# Patient Record
Sex: FEMALE | Race: WHITE | NOT HISPANIC OR LATINO | Employment: OTHER | URBAN - METROPOLITAN AREA
[De-identification: names, ages, dates, MRNs, and addresses within clinical notes are randomized per-mention and may not be internally consistent; named-entity substitution may affect disease eponyms.]

---

## 2023-09-15 LAB
ALANINE AMINOTRANSFERASE (SGPT) (U/L) IN SER/PLAS: 14 U/L (ref 7–45)
ALBUMIN (G/DL) IN SER/PLAS: 4.4 G/DL (ref 3.4–5)
ALKALINE PHOSPHATASE (U/L) IN SER/PLAS: 110 U/L (ref 33–136)
ANION GAP IN SER/PLAS: 16 MMOL/L (ref 10–20)
ASPARTATE AMINOTRANSFERASE (SGOT) (U/L) IN SER/PLAS: 19 U/L (ref 9–39)
BASOPHILS (10*3/UL) IN BLOOD BY AUTOMATED COUNT: 0.03 X10E9/L (ref 0–0.1)
BASOPHILS/100 LEUKOCYTES IN BLOOD BY AUTOMATED COUNT: 0.2 % (ref 0–2)
BILIRUBIN TOTAL (MG/DL) IN SER/PLAS: 0.6 MG/DL (ref 0–1.2)
CALCIDIOL (25 OH VITAMIN D3) (NG/ML) IN SER/PLAS: 49 NG/ML
CALCIUM (MG/DL) IN SER/PLAS: 9.9 MG/DL (ref 8.6–10.6)
CARBON DIOXIDE, TOTAL (MMOL/L) IN SER/PLAS: 28 MMOL/L (ref 21–32)
CHLORIDE (MMOL/L) IN SER/PLAS: 94 MMOL/L (ref 98–107)
CHOLESTEROL (MG/DL) IN SER/PLAS: 155 MG/DL (ref 0–199)
CHOLESTEROL IN HDL (MG/DL) IN SER/PLAS: 52.1 MG/DL
CHOLESTEROL/HDL RATIO: 3
CREATININE (MG/DL) IN SER/PLAS: 1.11 MG/DL (ref 0.5–1.05)
EOSINOPHILS (10*3/UL) IN BLOOD BY AUTOMATED COUNT: 0.01 X10E9/L (ref 0–0.7)
EOSINOPHILS/100 LEUKOCYTES IN BLOOD BY AUTOMATED COUNT: 0.1 % (ref 0–6)
ERYTHROCYTE DISTRIBUTION WIDTH (RATIO) BY AUTOMATED COUNT: 14.1 % (ref 11.5–14.5)
ERYTHROCYTE MEAN CORPUSCULAR HEMOGLOBIN CONCENTRATION (G/DL) BY AUTOMATED: 31.3 G/DL (ref 32–36)
ERYTHROCYTE MEAN CORPUSCULAR VOLUME (FL) BY AUTOMATED COUNT: 89 FL (ref 80–100)
ERYTHROCYTES (10*6/UL) IN BLOOD BY AUTOMATED COUNT: 4.57 X10E12/L (ref 4–5.2)
ESTIMATED AVERAGE GLUCOSE FOR HBA1C: 128 MG/DL
GFR FEMALE: 54 ML/MIN/1.73M2
GLUCOSE (MG/DL) IN SER/PLAS: 126 MG/DL (ref 74–99)
HEMATOCRIT (%) IN BLOOD BY AUTOMATED COUNT: 40.9 % (ref 36–46)
HEMOGLOBIN (G/DL) IN BLOOD: 12.8 G/DL (ref 12–16)
HEMOGLOBIN A1C/HEMOGLOBIN TOTAL IN BLOOD: 6.1 %
IMMATURE GRANULOCYTES/100 LEUKOCYTES IN BLOOD BY AUTOMATED COUNT: 0.7 % (ref 0–0.9)
LDL: 72 MG/DL (ref 0–99)
LEUKOCYTES (10*3/UL) IN BLOOD BY AUTOMATED COUNT: 13.9 X10E9/L (ref 4.4–11.3)
LYMPHOCYTES (10*3/UL) IN BLOOD BY AUTOMATED COUNT: 2.49 X10E9/L (ref 1.2–4.8)
LYMPHOCYTES/100 LEUKOCYTES IN BLOOD BY AUTOMATED COUNT: 17.9 % (ref 13–44)
MONOCYTES (10*3/UL) IN BLOOD BY AUTOMATED COUNT: 1.2 X10E9/L (ref 0.1–1)
MONOCYTES/100 LEUKOCYTES IN BLOOD BY AUTOMATED COUNT: 8.6 % (ref 2–10)
NEUTROPHILS (10*3/UL) IN BLOOD BY AUTOMATED COUNT: 10.06 X10E9/L (ref 1.2–7.7)
NEUTROPHILS/100 LEUKOCYTES IN BLOOD BY AUTOMATED COUNT: 72.5 % (ref 40–80)
NRBC (PER 100 WBCS) BY AUTOMATED COUNT: 0 /100 WBC (ref 0–0)
PARATHYRIN INTACT (PG/ML) IN SER/PLAS: 37.8 PG/ML (ref 18.5–88)
PLATELETS (10*3/UL) IN BLOOD AUTOMATED COUNT: 343 X10E9/L (ref 150–450)
POTASSIUM (MMOL/L) IN SER/PLAS: 3.5 MMOL/L (ref 3.5–5.3)
PROTEIN TOTAL: 7 G/DL (ref 6.4–8.2)
SODIUM (MMOL/L) IN SER/PLAS: 134 MMOL/L (ref 136–145)
TRIGLYCERIDE (MG/DL) IN SER/PLAS: 157 MG/DL (ref 0–149)
UREA NITROGEN (MG/DL) IN SER/PLAS: 19 MG/DL (ref 6–23)
VLDL: 31 MG/DL (ref 0–40)

## 2023-10-06 PROBLEM — D72.828 NEUTROPHILIC LEUKOCYTOSIS: Status: ACTIVE | Noted: 2023-10-06

## 2023-10-06 PROBLEM — G47.33 OSA ON CPAP: Status: ACTIVE | Noted: 2023-10-06

## 2023-10-06 PROBLEM — R19.4 CHANGE IN BOWEL HABITS: Status: ACTIVE | Noted: 2023-10-06

## 2023-10-06 PROBLEM — I25.10 CORONARY ARTERY DISEASE: Status: ACTIVE | Noted: 2023-10-06

## 2023-10-06 PROBLEM — R53.83 FATIGUE: Status: ACTIVE | Noted: 2023-10-06

## 2023-10-06 PROBLEM — M85.80 OSTEOPENIA: Status: ACTIVE | Noted: 2023-10-06

## 2023-10-06 PROBLEM — M06.09 SERONEGATIVE ARTHROPATHY OF MULTIPLE SITES (MULTI): Status: ACTIVE | Noted: 2023-10-06

## 2023-10-06 PROBLEM — E55.9 VITAMIN D DEFICIENCY: Status: ACTIVE | Noted: 2023-10-06

## 2023-10-06 PROBLEM — E11.29 TYPE 2 DIABETES MELLITUS WITH OTHER DIABETIC KIDNEY COMPLICATION (MULTI): Status: ACTIVE | Noted: 2023-10-06

## 2023-10-06 PROBLEM — R79.89 HIGH SERUM PARATHYROID HORMONE (PTH): Status: ACTIVE | Noted: 2023-10-06

## 2023-10-06 PROBLEM — I10 BENIGN ESSENTIAL HYPERTENSION: Status: ACTIVE | Noted: 2023-10-06

## 2023-10-06 PROBLEM — D72.9 NEUTROPHILIC LEUKOCYTOSIS: Status: ACTIVE | Noted: 2023-10-06

## 2023-10-06 PROBLEM — E11.29 MICROALBUMINURIA DUE TO TYPE 2 DIABETES MELLITUS (MULTI): Status: ACTIVE | Noted: 2023-10-06

## 2023-10-06 PROBLEM — G47.00 INSOMNIA: Status: ACTIVE | Noted: 2023-10-06

## 2023-10-06 PROBLEM — E66.3 OVERWEIGHT WITH BODY MASS INDEX (BMI) OF 28 TO 28.9 IN ADULT: Status: ACTIVE | Noted: 2023-10-06

## 2023-10-06 PROBLEM — R06.02 SHORTNESS OF BREATH AT REST: Status: ACTIVE | Noted: 2023-10-06

## 2023-10-06 PROBLEM — D86.0 SARCOIDOSIS OF LUNG (MULTI): Status: ACTIVE | Noted: 2023-10-06

## 2023-10-06 PROBLEM — E78.2 MIXED HYPERLIPIDEMIA: Status: ACTIVE | Noted: 2023-10-06

## 2023-10-06 PROBLEM — K52.9 DIARRHEA, SECRETORY: Status: ACTIVE | Noted: 2023-10-06

## 2023-10-06 PROBLEM — R80.9 MICROALBUMINURIA DUE TO TYPE 2 DIABETES MELLITUS (MULTI): Status: ACTIVE | Noted: 2023-10-06

## 2023-10-06 PROBLEM — M12.9 ARTHROPATHY: Status: ACTIVE | Noted: 2023-10-06

## 2023-10-06 PROBLEM — R00.1 BRADYCARDIA: Status: ACTIVE | Noted: 2023-10-06

## 2023-10-06 PROBLEM — F32.5 DEPRESSION, MAJOR, IN REMISSION (CMS-HCC): Status: ACTIVE | Noted: 2023-10-06

## 2023-10-06 PROBLEM — M19.90 OSTEOARTHRITIS: Status: ACTIVE | Noted: 2023-10-06

## 2023-10-06 PROBLEM — L40.9 PSORIASIS: Status: ACTIVE | Noted: 2023-10-06

## 2023-10-06 PROBLEM — F41.1 GENERALIZED ANXIETY DISORDER: Status: ACTIVE | Noted: 2023-10-06

## 2023-10-06 PROBLEM — D86.9 SARCOIDOSIS: Status: ACTIVE | Noted: 2023-10-06

## 2023-10-06 PROBLEM — M81.0 OSTEOPOROSIS: Status: ACTIVE | Noted: 2023-10-06

## 2023-10-06 RX ORDER — TRIAMCINOLONE ACETONIDE 1 MG/G
OINTMENT TOPICAL
COMMUNITY
Start: 2022-03-31

## 2023-10-06 RX ORDER — VENLAFAXINE HYDROCHLORIDE 150 MG/1
150 CAPSULE, EXTENDED RELEASE ORAL
COMMUNITY
Start: 2020-06-01 | End: 2024-05-09

## 2023-10-06 RX ORDER — PIMECROLIMUS 10 MG/G
CREAM TOPICAL 2 TIMES DAILY
COMMUNITY
Start: 2021-02-22 | End: 2023-11-02 | Stop reason: ALTCHOICE

## 2023-10-06 RX ORDER — PHENOL 1.4 %
1 AEROSOL, SPRAY (ML) MUCOUS MEMBRANE DAILY
COMMUNITY
Start: 2020-06-01

## 2023-10-06 RX ORDER — CHOLESTYRAMINE 4 G/9G
POWDER, FOR SUSPENSION ORAL
COMMUNITY
End: 2023-10-17 | Stop reason: SDUPTHER

## 2023-10-06 RX ORDER — CHOLECALCIFEROL (VITAMIN D3) 50 MCG
1 TABLET ORAL 2 TIMES DAILY
COMMUNITY
Start: 2020-09-14

## 2023-10-06 RX ORDER — MELOXICAM 15 MG/1
1 TABLET ORAL DAILY PRN
COMMUNITY
Start: 2020-06-01 | End: 2024-05-09

## 2023-10-06 RX ORDER — DAPAGLIFLOZIN 10 MG/1
1 TABLET, FILM COATED ORAL EVERY MORNING
COMMUNITY
Start: 2020-06-01 | End: 2024-01-04

## 2023-10-06 RX ORDER — ATENOLOL 25 MG/1
1 TABLET ORAL DAILY
COMMUNITY
Start: 2020-06-01

## 2023-10-06 RX ORDER — DOXEPIN 3 MG/1
1 TABLET, FILM COATED ORAL NIGHTLY PRN
COMMUNITY

## 2023-10-06 RX ORDER — SEMAGLUTIDE 1.34 MG/ML
0.5 INJECTION, SOLUTION SUBCUTANEOUS
COMMUNITY
Start: 2021-09-01 | End: 2023-11-28 | Stop reason: ALTCHOICE

## 2023-10-06 RX ORDER — HYDROXYCHLOROQUINE SULFATE 200 MG/1
1 TABLET, FILM COATED ORAL 2 TIMES DAILY
COMMUNITY
Start: 2020-06-01 | End: 2023-11-02 | Stop reason: SDUPTHER

## 2023-10-06 RX ORDER — CALCIUM CARBONATE 600 MG
2 TABLET ORAL DAILY
COMMUNITY
Start: 2020-06-01

## 2023-10-06 RX ORDER — BUTYROSPERMUM PARKII(SHEA BUTTER), SIMMONDSIA CHINENSIS (JOJOBA) SEED OIL, ALOE BARBADENSIS LEAF EXTRACT .01; 1; 3.5 G/100G; G/100G; G/100G
250 LIQUID TOPICAL 2 TIMES DAILY
COMMUNITY

## 2023-10-06 RX ORDER — HYDROCORTISONE 25 MG/G
CREAM TOPICAL
COMMUNITY
Start: 2022-03-17

## 2023-10-06 RX ORDER — INSULIN DEGLUDEC 100 U/ML
28 INJECTION, SOLUTION SUBCUTANEOUS NIGHTLY
COMMUNITY
Start: 2020-06-01 | End: 2023-11-02 | Stop reason: ALTCHOICE

## 2023-10-06 RX ORDER — LISINOPRIL 10 MG/1
1 TABLET ORAL DAILY
COMMUNITY
Start: 2020-06-01 | End: 2024-01-04

## 2023-10-06 RX ORDER — CLOBETASOL PROPIONATE 0.05 MG/G
GEL TOPICAL 2 TIMES DAILY
COMMUNITY

## 2023-10-06 RX ORDER — LOVASTATIN 10 MG/1
1 TABLET ORAL EVERY EVENING
COMMUNITY
Start: 2020-06-01 | End: 2024-05-09

## 2023-10-06 RX ORDER — TRIAMTERENE AND HYDROCHLOROTHIAZIDE 37.5; 25 MG/1; MG/1
1 CAPSULE ORAL DAILY
COMMUNITY
Start: 2020-06-01

## 2023-10-09 ENCOUNTER — OFFICE VISIT (OUTPATIENT)
Dept: HEMATOLOGY/ONCOLOGY | Facility: CLINIC | Age: 68
End: 2023-10-09
Payer: COMMERCIAL

## 2023-10-09 ENCOUNTER — LAB (OUTPATIENT)
Dept: LAB | Facility: LAB | Age: 68
End: 2023-10-09
Payer: COMMERCIAL

## 2023-10-09 VITALS
HEART RATE: 74 BPM | DIASTOLIC BLOOD PRESSURE: 80 MMHG | SYSTOLIC BLOOD PRESSURE: 120 MMHG | OXYGEN SATURATION: 96 % | BODY MASS INDEX: 25.12 KG/M2 | RESPIRATION RATE: 18 BRPM | WEIGHT: 160.05 LBS | HEIGHT: 67 IN | TEMPERATURE: 98.2 F

## 2023-10-09 DIAGNOSIS — D72.9 NEUTROPHILIC LEUKOCYTOSIS: Primary | ICD-10-CM

## 2023-10-09 DIAGNOSIS — D72.9 NEUTROPHILIC LEUKOCYTOSIS: ICD-10-CM

## 2023-10-09 PROCEDURE — 1126F AMNT PAIN NOTED NONE PRSNT: CPT | Performed by: INTERNAL MEDICINE

## 2023-10-09 PROCEDURE — 3044F HG A1C LEVEL LT 7.0%: CPT | Performed by: INTERNAL MEDICINE

## 2023-10-09 PROCEDURE — 99204 OFFICE O/P NEW MOD 45 MIN: CPT | Performed by: INTERNAL MEDICINE

## 2023-10-09 PROCEDURE — 36415 COLL VENOUS BLD VENIPUNCTURE: CPT

## 2023-10-09 PROCEDURE — 1159F MED LIST DOCD IN RCRD: CPT | Performed by: INTERNAL MEDICINE

## 2023-10-09 PROCEDURE — G0452 MOLECULAR PATHOLOGY INTERPR: HCPCS | Performed by: INTERNAL MEDICINE

## 2023-10-09 PROCEDURE — 81450 HL NEO GSAP 5-50DNA/DNA&RNA: CPT

## 2023-10-09 PROCEDURE — 3079F DIAST BP 80-89 MM HG: CPT | Performed by: INTERNAL MEDICINE

## 2023-10-09 PROCEDURE — 4010F ACE/ARB THERAPY RXD/TAKEN: CPT | Performed by: INTERNAL MEDICINE

## 2023-10-09 PROCEDURE — 99214 OFFICE O/P EST MOD 30 MIN: CPT | Performed by: INTERNAL MEDICINE

## 2023-10-09 PROCEDURE — 3074F SYST BP LT 130 MM HG: CPT | Performed by: INTERNAL MEDICINE

## 2023-10-09 ASSESSMENT — PAIN SCALES - GENERAL: PAINLEVEL: 0-NO PAIN

## 2023-10-09 NOTE — PROGRESS NOTES
Patient ID: Liliana Bhardwaj is a 68 y.o. female.  Referring Physician: No referring provider defined for this encounter.  Primary Care Provider: Ariana Escalera MD      Subjective    HPI  The patient is a 68-year-old woman with extensive past medical history with coronary artery disease, diabetes mellitus hypertension hypercholesterolemia elevated PTH levels sarcoidosis was referred for further evaluation and management of leukocytosis/neutrophilia.  The patient is asymptomatic and surprised to be referred.    At interview on October 9, 2023 she was accompanied by her .  The patient narrated entire history.  Denied history of weight loss fevers night sweats chest pain shortness of breath nausea vomiting hematemesis melena hematochezia hematuria     · Arthropathy (716.90) (M12.9)   · Benign essential hypertension (401.1) (I10)   · Bradycardia (427.89) (R00.1)   · Change in bowel habits (787.99) (R19.4)   · Colon cancer screening (V76.51) (Z12.11)   · Coronary artery disease (414.00) (I25.10)   · Cough (786.2) (R05.9)   · Depression, major, in remission (296.25) (F32.5)   · Diarrhea, secretory (787.91) (K52.9)   · Fatigue (780.79) (R53.83)   · Generalized anxiety disorder (300.02) (F41.1)   · High serum parathyroid hormone (PTH) (790.99) (R79.89)   · Mammogram declined (V64.2) (Z53.20)   · Microalbuminuria due to type 2 diabetes mellitus (250.40,791.0) (E11.29,R80.9)   · Mixed hyperlipidemia (272.2) (E78.2)   · Need for influenza vaccination (V04.81) (Z23)   · Need for pneumococcal vaccination (V03.82) (Z23)   · CALEB on CPAP (327.23) (G47.33)   · Osteoarthritis (715.90) (M19.90)   · Osteopenia (733.90) (M85.80)   · Osteoporosis (733.00) (M81.0)   · Pain of left upper extremity (729.5) (M79.602)   · Psoriasis (696.1) (L40.9)   · Sarcoidosis (135) (D86.9)   · Sarcoidosis of lung (135,517.8) (D86.0)   · 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 von Willebrands   · History of COVID (079.89) (U07.1)   · Resolved Date: 23 Feb 2023   · History of COVID-19 virus infection (079.89) (U07.1)   · Resolved Date: 07 Jun 2021   · History of COVID-19 virus infection (079.89) (U07.1)   · Resolved Date: 23 Feb 2023   · History of Fracture of left humerus (812.20) (S42.302A)   · Resolved Date: 14 Sep 2020   · History of myocardial infarction (412) (I25.2)   · History of osteoporosis (V13.59) (Z87.39)   · Resolved Date: 28 Dec 2020   · History of Sarcoidosis, lung (135,517.8) (D86.0)     Surgical History  Problems    · History of Ankle fracture repair   · right   · History of Carpal tunnel surgery   · right and left at same time   · History of Cataract surgery   · History of Cholecystectomy   · History of Complete colonoscopy   · repeat 3 years. Dr. Quevedo   · History of Coronary artery bypass graft   · x4   · History of Lumbar vertebral fusion   · History of Lung biopsy     Family History  Mother    · Family history of coronary artery disease (V17.3) (Z82.49)   · Family history of type 2 diabetes mellitus (V18.0) (Z83.3)  Father    · Family history of coronary artery disease (V17.3) (Z82.49)   · Family history of mixed hyperlipidemia (V18.3) (Z83.438)   · Family history of neoplasm of brain (V19.8) (Z84.89)  Brother    · Family history of coronary artery disease (V17.3) (Z82.49)  Paternal Grandfather    · Family history of type 2 diabetes mellitus (V18.0) (Z83.3)     Social History  Problems    · Consumes alcohol occasionally (V49.89) (Z78.9)   · Daily caffeine consumption   · Disabled   ·    · Does not use  "illicit drugs (V49.89) (Z78.9)   · Former smoker (V15.82) (Z87.891)   · Former tobacco use (V15.82) (Z87.891)   · smoked 5 packs a day for 17 years quit in 1990   ·    · Mother   · No exposure to domestic violence       Review of Systems   All other systems reviewed and are negative.       Objective   BSA: 1.85 meters squared  /80 (BP Location: Left arm, Patient Position: Sitting, BP Cuff Size: Adult)   Pulse 74   Temp 36.8 °C (98.2 °F) (Temporal)   Resp 18   Ht 1.69 m (5' 6.54\")   Wt 72.6 kg (160 lb 0.9 oz)   SpO2 96%   BMI 25.42 kg/m²     Family History   Problem Relation Name Age of Onset    Coronary artery disease Mother      Diabetes type II Mother      Coronary artery disease Father      Hyperlipidemia Father          mixed    Brain cancer Father      Coronary artery disease Brother      Diabetes type II Paternal Grandfather       Oncology History    No history exists.       Liliana HATCH Lashae  has no history on file for tobacco use.  She  has no history on file for alcohol use.  She  has no history on file for drug use.    Physical Exam  Constitutional:       Appearance: Normal appearance.   HENT:      Head: Normocephalic and atraumatic.      Nose: Nose normal.      Mouth/Throat:      Mouth: Mucous membranes are moist.      Pharynx: Oropharynx is clear.   Eyes:      Extraocular Movements: Extraocular movements intact.      Conjunctiva/sclera: Conjunctivae normal.      Pupils: Pupils are equal, round, and reactive to light.   Cardiovascular:      Rate and Rhythm: Normal rate and regular rhythm.   Pulmonary:      Effort: Pulmonary effort is normal.      Breath sounds: Normal breath sounds.   Abdominal:      General: Abdomen is flat. Bowel sounds are normal.      Palpations: Abdomen is soft.   Musculoskeletal:         General: Normal range of motion.      Cervical back: Normal range of motion and neck supple.   Neurological:      General: No focal deficit present.      Mental Status: She is alert " and oriented to person, place, and time. Mental status is at baseline.   Psychiatric:         Mood and Affect: Mood normal.         Behavior: Behavior normal.         Thought Content: Thought content normal.         Judgment: Judgment normal.         Performance Status:  Asymptomatic    Assessment/Plan    The patient is a 68-year-old woman with extensive past medical history with coronary artery disease, diabetes mellitus hypertension hypercholesterolemia elevated PTH levels sarcoidosis was referred for further evaluation and management of leukocytosis/neutrophilia.  The patient is asymptomatic and surprised to be referred.    Physical examination revealed obesity.  I had a detailed discussion with the patient explained to her the role of WBCs and protecting the body against infection.  Differential diagnosis of leukocytosis includes primary causes such as myeloproliferative syndrome secondary causes such as demargination from medications, smoking, infection inflammation.  The patient uses triamcinolone for psoriasis and also has underlying sarcoidosis which could both cause leukocytosis/neutrophilia.  However it would be prudent to rule out myeloproliferative syndrome and recommended MPN studies.  The patient understood appreciated all the details provided and was grateful    Thank you for allowing me to participate in care of your patient if you have any questions please feel free to call me.       Orders Placed This Encounter   Procedures    Myeloid Malignancies NGS Panel     Standing Status:   Future     Standing Expiration Date:   10/9/2024     Order Specific Question:   Release result to [employer]     Answer:   Immediate                  There are no diagnoses linked to this encounter.           Roger Schmitz MD

## 2023-10-10 ENCOUNTER — TELEPHONE (OUTPATIENT)
Dept: HEMATOLOGY/ONCOLOGY | Facility: CLINIC | Age: 68
End: 2023-10-10
Payer: COMMERCIAL

## 2023-10-13 LAB
ELECTRONICALLY SIGNED BY: NORMAL
MYELOID NGS RESULTS: NORMAL

## 2023-10-16 PROBLEM — S42.202D CLOSED FRACTURE OF PROXIMAL END OF LEFT HUMERUS WITH ROUTINE HEALING: Status: ACTIVE | Noted: 2020-04-10

## 2023-10-16 PROBLEM — I25.10 CORONARY ARTERIOSCLEROSIS: Status: ACTIVE | Noted: 2021-06-22

## 2023-10-16 PROBLEM — E11.9 TYPE 2 DIABETES MELLITUS (MULTI): Status: ACTIVE | Noted: 2021-06-22

## 2023-10-16 RX ORDER — CLOBETASOL PROPIONATE 0.5 MG/G
0.05 OINTMENT TOPICAL 2 TIMES DAILY
COMMUNITY
Start: 2023-02-23 | End: 2023-11-28 | Stop reason: ALTCHOICE

## 2023-10-16 RX ORDER — MOLNUPIRAVIR 200 MG/1
1 CAPSULE ORAL DAILY
COMMUNITY
Start: 2022-12-29 | End: 2023-11-02 | Stop reason: ALTCHOICE

## 2023-10-16 RX ORDER — GABAPENTIN 600 MG/1
900 TABLET ORAL DAILY
COMMUNITY
End: 2023-11-02 | Stop reason: ALTCHOICE

## 2023-10-16 RX ORDER — FERROUS SULFATE, DRIED 160(50) MG
1200 TABLET, EXTENDED RELEASE ORAL DAILY
COMMUNITY
End: 2023-10-31 | Stop reason: SDUPTHER

## 2023-10-16 RX ORDER — ASPIRIN 81 MG/1
81 TABLET ORAL
COMMUNITY

## 2023-10-16 RX ORDER — DEXAMETHASONE 6 MG/1
6 TABLET ORAL DAILY
COMMUNITY
Start: 2022-12-29 | End: 2023-11-02 | Stop reason: ALTCHOICE

## 2023-10-16 RX ORDER — FOLIC ACID 1 MG/1
1 TABLET ORAL DAILY
COMMUNITY
Start: 2020-03-18 | End: 2023-11-02 | Stop reason: ALTCHOICE

## 2023-10-16 RX ORDER — METOCLOPRAMIDE 5 MG/1
5 TABLET ORAL 4 TIMES DAILY
COMMUNITY
Start: 2019-09-30 | End: 2023-11-02 | Stop reason: ALTCHOICE

## 2023-10-16 RX ORDER — CETIRIZINE HYDROCHLORIDE 10 MG/1
10 TABLET ORAL DAILY
COMMUNITY
Start: 2016-09-23 | End: 2023-11-28 | Stop reason: ALTCHOICE

## 2023-10-16 RX ORDER — METFORMIN HYDROCHLORIDE 500 MG/1
500 TABLET ORAL
COMMUNITY
Start: 2016-11-08 | End: 2023-11-28 | Stop reason: ALTCHOICE

## 2023-10-16 RX ORDER — INSULIN GLARGINE 100 [IU]/ML
28 INJECTION, SOLUTION SUBCUTANEOUS 2 TIMES DAILY
COMMUNITY
Start: 2017-09-11 | End: 2024-02-16 | Stop reason: SDUPTHER

## 2023-10-16 RX ORDER — METOPROLOL TARTRATE 25 MG/1
25 TABLET, FILM COATED ORAL
COMMUNITY
End: 2023-11-28 | Stop reason: ALTCHOICE

## 2023-10-16 RX ORDER — RISANKIZUMAB-RZAA 75 MG/0.83
150 KIT SUBCUTANEOUS
COMMUNITY
Start: 2020-06-09 | End: 2023-11-02 | Stop reason: ALTCHOICE

## 2023-10-16 RX ORDER — ALENDRONATE SODIUM 70 MG/1
70 TABLET ORAL
COMMUNITY
End: 2023-11-02 | Stop reason: SINTOL

## 2023-10-16 RX ORDER — METHOTREXATE 2.5 MG/1
2.5 TABLET ORAL
COMMUNITY
Start: 2020-03-17 | End: 2023-11-02 | Stop reason: ALTCHOICE

## 2023-10-16 RX ORDER — SEMAGLUTIDE 0.68 MG/ML
2 INJECTION, SOLUTION SUBCUTANEOUS
COMMUNITY
Start: 2023-04-25 | End: 2023-10-31 | Stop reason: SDUPTHER

## 2023-10-16 RX ORDER — SEMAGLUTIDE 1.34 MG/ML
2 INJECTION, SOLUTION SUBCUTANEOUS
COMMUNITY
Start: 2023-07-31 | End: 2024-05-13

## 2023-10-16 RX ORDER — ZOLPIDEM TARTRATE 5 MG/1
5 TABLET ORAL NIGHTLY PRN
COMMUNITY
End: 2023-11-02 | Stop reason: ALTCHOICE

## 2023-10-17 ENCOUNTER — TELEPHONE (OUTPATIENT)
Dept: PRIMARY CARE | Facility: CLINIC | Age: 68
End: 2023-10-17
Payer: COMMERCIAL

## 2023-10-17 DIAGNOSIS — E78.2 MIXED HYPERLIPIDEMIA: Primary | ICD-10-CM

## 2023-10-17 RX ORDER — CHOLESTYRAMINE 4 G/9G
1 POWDER, FOR SUSPENSION ORAL DAILY
Qty: 90 PACKET | Refills: 0 | Status: SHIPPED | OUTPATIENT
Start: 2023-10-17 | End: 2024-02-16 | Stop reason: SDUPTHER

## 2023-10-17 RX ORDER — CHOLESTYRAMINE 4 G/9G
1 POWDER, FOR SUSPENSION ORAL DAILY
Qty: 30 PACKET | Refills: 0 | Status: SHIPPED | OUTPATIENT
Start: 2023-10-17 | End: 2023-10-17 | Stop reason: SDUPTHER

## 2023-10-17 NOTE — TELEPHONE ENCOUNTER
Pt called.  She can not get in touch with dr. Mercado  She is in [place]   Can you call in Cholestyramine 4 mg packet   [facility] [address] [place] [address]   Ph [phone]    *would not let  me add to pharmacy list

## 2023-10-31 PROBLEM — I25.10 CORONARY ARTERIOSCLEROSIS: Status: RESOLVED | Noted: 2021-06-22 | Resolved: 2023-10-31

## 2023-10-31 PROBLEM — E11.9 TYPE 2 DIABETES MELLITUS (MULTI): Status: RESOLVED | Noted: 2021-06-22 | Resolved: 2023-10-31

## 2023-10-31 PROBLEM — R19.4 CHANGE IN BOWEL HABITS: Status: RESOLVED | Noted: 2023-10-06 | Resolved: 2023-10-31

## 2023-11-02 ENCOUNTER — LAB (OUTPATIENT)
Dept: LAB | Facility: LAB | Age: 68
End: 2023-11-02
Payer: COMMERCIAL

## 2023-11-02 ENCOUNTER — OFFICE VISIT (OUTPATIENT)
Dept: RHEUMATOLOGY | Facility: CLINIC | Age: 68
End: 2023-11-02
Payer: COMMERCIAL

## 2023-11-02 ENCOUNTER — OFFICE VISIT (OUTPATIENT)
Dept: HEMATOLOGY/ONCOLOGY | Facility: CLINIC | Age: 68
End: 2023-11-02
Payer: COMMERCIAL

## 2023-11-02 VITALS
DIASTOLIC BLOOD PRESSURE: 64 MMHG | OXYGEN SATURATION: 95 % | WEIGHT: 161.05 LBS | HEART RATE: 80 BPM | TEMPERATURE: 97.2 F | SYSTOLIC BLOOD PRESSURE: 132 MMHG | RESPIRATION RATE: 18 BRPM | BODY MASS INDEX: 25.58 KG/M2

## 2023-11-02 VITALS
TEMPERATURE: 96.7 F | HEIGHT: 66 IN | DIASTOLIC BLOOD PRESSURE: 60 MMHG | SYSTOLIC BLOOD PRESSURE: 111 MMHG | BODY MASS INDEX: 26.02 KG/M2 | WEIGHT: 161.9 LBS | HEART RATE: 71 BPM

## 2023-11-02 DIAGNOSIS — Z79.899 ENCOUNTER FOR MONITORING OF HYDROXYCHLOROQUINE THERAPY: ICD-10-CM

## 2023-11-02 DIAGNOSIS — M81.0 AGE-RELATED OSTEOPOROSIS WITHOUT CURRENT PATHOLOGICAL FRACTURE: ICD-10-CM

## 2023-11-02 DIAGNOSIS — D86.0 SARCOIDOSIS OF LUNG (MULTI): ICD-10-CM

## 2023-11-02 DIAGNOSIS — Z79.631 ON METHOTREXATE THERAPY: ICD-10-CM

## 2023-11-02 DIAGNOSIS — D86.9 SARCOIDOSIS: ICD-10-CM

## 2023-11-02 DIAGNOSIS — M06.09 SERONEGATIVE ARTHROPATHY OF MULTIPLE SITES (MULTI): Primary | ICD-10-CM

## 2023-11-02 DIAGNOSIS — E11.42 DIABETIC POLYNEUROPATHY ASSOCIATED WITH TYPE 2 DIABETES MELLITUS (MULTI): ICD-10-CM

## 2023-11-02 DIAGNOSIS — D72.9 NEUTROPHILIC LEUKOCYTOSIS: Primary | ICD-10-CM

## 2023-11-02 DIAGNOSIS — Z51.81 ENCOUNTER FOR MONITORING OF HYDROXYCHLOROQUINE THERAPY: ICD-10-CM

## 2023-11-02 DIAGNOSIS — M06.09 SERONEGATIVE ARTHROPATHY OF MULTIPLE SITES (MULTI): ICD-10-CM

## 2023-11-02 PROBLEM — M85.80 OSTEOPENIA: Status: RESOLVED | Noted: 2023-10-06 | Resolved: 2023-11-02

## 2023-11-02 PROCEDURE — 4010F ACE/ARB THERAPY RXD/TAKEN: CPT | Performed by: INTERNAL MEDICINE

## 2023-11-02 PROCEDURE — 1159F MED LIST DOCD IN RCRD: CPT | Performed by: INTERNAL MEDICINE

## 2023-11-02 PROCEDURE — 99214 OFFICE O/P EST MOD 30 MIN: CPT | Performed by: INTERNAL MEDICINE

## 2023-11-02 PROCEDURE — 1036F TOBACCO NON-USER: CPT | Performed by: INTERNAL MEDICINE

## 2023-11-02 PROCEDURE — 1160F RVW MEDS BY RX/DR IN RCRD: CPT | Performed by: INTERNAL MEDICINE

## 2023-11-02 PROCEDURE — 1126F AMNT PAIN NOTED NONE PRSNT: CPT | Performed by: INTERNAL MEDICINE

## 2023-11-02 PROCEDURE — 36415 COLL VENOUS BLD VENIPUNCTURE: CPT

## 2023-11-02 PROCEDURE — 3078F DIAST BP <80 MM HG: CPT | Performed by: INTERNAL MEDICINE

## 2023-11-02 PROCEDURE — 3044F HG A1C LEVEL LT 7.0%: CPT | Performed by: INTERNAL MEDICINE

## 2023-11-02 PROCEDURE — 3074F SYST BP LT 130 MM HG: CPT | Performed by: INTERNAL MEDICINE

## 2023-11-02 PROCEDURE — 85652 RBC SED RATE AUTOMATED: CPT

## 2023-11-02 PROCEDURE — 3075F SYST BP GE 130 - 139MM HG: CPT | Performed by: INTERNAL MEDICINE

## 2023-11-02 PROCEDURE — 82164 ANGIOTENSIN I ENZYME TEST: CPT

## 2023-11-02 PROCEDURE — 86140 C-REACTIVE PROTEIN: CPT

## 2023-11-02 RX ORDER — HYDROXYCHLOROQUINE SULFATE 200 MG/1
200 TABLET, FILM COATED ORAL 2 TIMES DAILY
Qty: 180 TABLET | Refills: 3 | Status: SHIPPED | OUTPATIENT
Start: 2023-11-02 | End: 2023-11-02 | Stop reason: SDUPTHER

## 2023-11-02 RX ORDER — HYDROXYCHLOROQUINE SULFATE 200 MG/1
200 TABLET, FILM COATED ORAL 2 TIMES DAILY
Qty: 180 TABLET | Refills: 3 | Status: SHIPPED | OUTPATIENT
Start: 2023-11-02 | End: 2024-11-01

## 2023-11-02 ASSESSMENT — ENCOUNTER SYMPTOMS
BRUISES/BLEEDS EASILY: 1
VOMITING: 0
NUMBNESS: 1
FATIGUE: 1
SLEEP DISTURBANCE: 1
DEPRESSION: 0
ADENOPATHY: 0
EYE REDNESS: 0
MYALGIAS: 1
NAUSEA: 1
UNEXPECTED WEIGHT CHANGE: 1
DIFFICULTY URINATING: 0
NERVOUS/ANXIOUS: 1
EYE ITCHING: 0
EYE DISCHARGE: 0
PALPITATIONS: 0
OCCASIONAL FEELINGS OF UNSTEADINESS: 1
HEADACHES: 0
FLANK PAIN: 0
BACK PAIN: 0
DYSPHORIC MOOD: 1
JOINT SWELLING: 1
POLYDIPSIA: 0
SHORTNESS OF BREATH: 1
LOSS OF SENSATION IN FEET: 0
COLOR CHANGE: 0
RHINORRHEA: 0
EYE PAIN: 0
WEAKNESS: 0
LIGHT-HEADEDNESS: 0
CHEST TIGHTNESS: 0
PHOTOPHOBIA: 0
DYSURIA: 0
FEVER: 1
DIZZINESS: 0
DIARRHEA: 1
CHILLS: 0
CONSTIPATION: 0
ABDOMINAL PAIN: 0
SORE THROAT: 1
TROUBLE SWALLOWING: 0
ARTHRALGIAS: 1
COUGH: 1
HEMATURIA: 0

## 2023-11-02 ASSESSMENT — PATIENT HEALTH QUESTIONNAIRE - PHQ9
2. FEELING DOWN, DEPRESSED OR HOPELESS: NOT AT ALL
1. LITTLE INTEREST OR PLEASURE IN DOING THINGS: NOT AT ALL
SUM OF ALL RESPONSES TO PHQ9 QUESTIONS 1 AND 2: 0
SUM OF ALL RESPONSES TO PHQ9 QUESTIONS 1 AND 2: 0
2. FEELING DOWN, DEPRESSED OR HOPELESS: NOT AT ALL
1. LITTLE INTEREST OR PLEASURE IN DOING THINGS: NOT AT ALL

## 2023-11-02 ASSESSMENT — PAIN SCALES - GENERAL: PAINLEVEL: 0-NO PAIN

## 2023-11-02 ASSESSMENT — COLUMBIA-SUICIDE SEVERITY RATING SCALE - C-SSRS
6. HAVE YOU EVER DONE ANYTHING, STARTED TO DO ANYTHING, OR PREPARED TO DO ANYTHING TO END YOUR LIFE?: NO
2. HAVE YOU ACTUALLY HAD ANY THOUGHTS OF KILLING YOURSELF?: NO
1. IN THE PAST MONTH, HAVE YOU WISHED YOU WERE DEAD OR WISHED YOU COULD GO TO SLEEP AND NOT WAKE UP?: NO

## 2023-11-02 NOTE — ASSESSMENT & PLAN NOTE
You are on chronic plaquenil.     Make sure you see your eye doctor yearly.  If you need an eye doctor, let me know and I can place a referral    Plaquenil is dosed based on your weight.   We will make sure your dose is below the maximum daily dose of 5mg/kg

## 2023-11-02 NOTE — PROGRESS NOTES
Follow-up in 6 months with labs prior outside.  Call back instructions reviewed.  Patient verbalized understanding.

## 2023-11-02 NOTE — PROGRESS NOTES
Patient ID: Liliana Bhardwaj is a 68 y.o. female.  Referring Physician: No referring provider defined for this encounter.  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 TET2 mutations may indicate clonal hematopoiesis when myelodysplastic syndrome (MDS) is suspected, but they should not be used as presumptive evidence of MDS when other diagnostic criteria for MDS have not been met (NCCN 1.2021).  In patients with MDS, TET2 mutations have been associated with a normal karyotype and shown to positively impact the response to hypomethylating agents compared to patients with wild-type TET2 (NCCN 1.2021). TET2 mutations are also frequently identified in cases of CMML (40-60%) and MDS/MPN unclassifiable (overlap syndrome) (NCCN 1.2021). Results should be interpreted in the context of clinical and additional laboratory information.      DISEASE RELEVANT ALTERATIONS NOT DETECTED:  Negative for JAK2 V617F.  Negative for JAK2 exon 12 mutation.  Negative for CALR mutation.  Negative for MPL mutation.               Subjective    HPI  The patient is a 68-year-old woman with extensive past medical history with coronary artery disease, diabetes mellitus hypertension hypercholesterolemia elevated PTH levels sarcoidosis was referred for further evaluation and management of leukocytosis/neutrophilia.  The patient is asymptomatic and surprised to be referred.    At interview on October 9, 2023 she was accompanied by her .  The patient narrated entire history.  Denied history of weight loss fevers night sweats chest pain shortness of breath nausea vomiting hematemesis melena hematochezia hematuria.    The patient and her  had come for follow-up regarding evaluation of leukocytosis/neutrophilia.  The patient is  asymptomatic but is anxious to know results of the tests performed.     · Arthropathy (716.90) (M12.9)   · Benign essential hypertension (401.1) (I10)   · Bradycardia (427.89) (R00.1)   · Change in bowel habits (787.99) (R19.4)   · Colon cancer screening (V76.51) (Z12.11)   · Coronary artery disease (414.00) (I25.10)   · Cough (786.2) (R05.9)   · Depression, major, in remission (296.25) (F32.5)   · Diarrhea, secretory (787.91) (K52.9)   · Fatigue (780.79) (R53.83)   · Generalized anxiety disorder (300.02) (F41.1)   · High serum parathyroid hormone (PTH) (790.99) (R79.89)   · Mammogram declined (V64.2) (Z53.20)   · Microalbuminuria due to type 2 diabetes mellitus (250.40,791.0) (E11.29,R80.9)   · Mixed hyperlipidemia (272.2) (E78.2)   · Need for influenza vaccination (V04.81) (Z23)   · Need for pneumococcal vaccination (V03.82) (Z23)   · CALEB on CPAP (327.23) (G47.33)   · Osteoarthritis (715.90) (M19.90)   · Osteopenia (733.90) (M85.80)   · Osteoporosis (733.00) (M81.0)   · Pain of left upper extremity (729.5) (M79.602)   · Psoriasis (696.1) (L40.9)   · Sarcoidosis (135) (D86.9)   · Sarcoidosis of lung (135,517.8) (D86.0)   · 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 von [employer]illebrands   · History of COVID (079.89) (U07.1)   · Resolved Date: 23 Feb 2023   · History of COVID-19 virus infection (079.89) (U07.1)   · Resolved Date: 07 Jun 2021   · History of COVID-19 virus infection (079.89) (U07.1)   · Resolved Date: 23 Feb 2023   · History of Fracture of left humerus (812.20) (S42.302A)   · Resolved Date: 14 Sep 2020   · History  of myocardial infarction (412) (I25.2)   · History of osteoporosis (V13.59) (Z87.39)   · Resolved Date: 28 Dec 2020   · History of Sarcoidosis, lung (135,517.8) (D86.0)     Surgical History  Problems    · History of Ankle fracture repair   · right   · History of Carpal tunnel surgery   · right and left at same time   · History of Cataract surgery   · History of Cholecystectomy   · History of Complete colonoscopy   · repeat 3 years. Dr. Quevedo   · History of Coronary artery bypass graft   · x4   · History of Lumbar vertebral fusion   · History of Lung biopsy     Family History  Mother    · Family history of coronary artery disease (V17.3) (Z82.49)   · Family history of type 2 diabetes mellitus (V18.0) (Z83.3)  Father    · Family history of coronary artery disease (V17.3) (Z82.49)   · Family history of mixed hyperlipidemia (V18.3) (Z83.438)   · Family history of neoplasm of brain (V19.8) (Z84.89)  Brother    · Family history of coronary artery disease (V17.3) (Z82.49)  Paternal Grandfather    · Family history of type 2 diabetes mellitus (V18.0) (Z83.3)     Social History  Problems    · Consumes alcohol occasionally (V49.89) (Z78.9)   · Daily caffeine consumption   · Disabled   ·    · Does not use illicit drugs (V49.89) (Z78.9)   · Former smoker (V15.82) (Z87.891)   · Former tobacco use (V15.82) (Z87.891)   · smoked 5 packs a day for 17 years quit in 1990   ·    · Mother   · No exposure to domestic violence       Review of Systems   All other systems reviewed and are negative.       Objective   BSA: 1.85 meters squared  /64 (BP Location: Left arm, Patient Position: Sitting)   Pulse 80   Temp 36.2 °C (97.2 °F)   Resp 18   Wt 73.1 kg (161 lb 0.7 oz)   SpO2 95%   BMI 25.58 kg/m²     Family History   Problem Relation Name Age of Onset    Coronary artery disease Mother Corazon     Diabetes type II Mother Corazon     Depression Mother Corazon     Heart disease Mother Corazon      Hypertension Mother Corazon     Miscarriages / Stillbirths Mother Corazon     Coronary artery disease Father Eliot     Hyperlipidemia Father Eliot         mixed    Brain cancer Father Eliot     Alcohol abuse Father Eliot     Heart disease Father Eliot     Coronary artery disease Brother Harlan     Alcohol abuse Brother Harlan     Cancer Brother Harlan     Diabetes type II Paternal Grandfather       Oncology History    No history exists.       Liliana Bhardwaj  reports that she has quit smoking. Her smoking use included cigarettes. She has never used smokeless tobacco.  She  reports that she does not currently use alcohol.  She  reports that she does not currently use drugs.    Physical Exam  Constitutional:       Appearance: Normal appearance.   HENT:      Head: Normocephalic and atraumatic.      Nose: Nose normal.      Mouth/Throat:      Mouth: Mucous membranes are moist.      Pharynx: Oropharynx is clear.   Eyes:      Extraocular Movements: Extraocular movements intact.      Conjunctiva/sclera: Conjunctivae normal.      Pupils: Pupils are equal, round, and reactive to light.   Cardiovascular:      Rate and Rhythm: Normal rate and regular rhythm.   Pulmonary:      Effort: Pulmonary effort is normal.      Breath sounds: Normal breath sounds.   Abdominal:      General: Abdomen is flat. Bowel sounds are normal.      Palpations: Abdomen is soft.   Musculoskeletal:         General: Normal range of motion.      Cervical back: Normal range of motion and neck supple.   Neurological:      General: No focal deficit present.      Mental Status: She is alert and oriented to person, place, and time. Mental status is at baseline.   Psychiatric:         Mood and Affect: Mood normal.         Behavior: Behavior normal.         Thought Content: Thought content normal.         Judgment: Judgment normal.         Performance Status:  Asymptomatic    Assessment/Plan    The patient is a 68-year-old woman with extensive past medical history  with coronary artery disease, diabetes mellitus hypertension hypercholesterolemia elevated PTH levels sarcoidosis was referred for further evaluation and management of leukocytosis/neutrophilia.  The patient is asymptomatic and surprised to be referred.    Physical examination revealed obesity.  I had a detailed discussion with the patient explained to her the role of WBCs and protecting the body against infection.  Differential diagnosis of leukocytosis includes primary causes such as myeloproliferative syndrome secondary causes such as demargination from medications, smoking, infection inflammation.  The patient uses triamcinolone for psoriasis and also has underlying sarcoidosis which could both cause leukocytosis/neutrophilia.  However it would be prudent to rule out myeloproliferative syndrome and recommended MPN studies.  The patient understood appreciated all the details provided and was grateful    The patient and her  had come for follow-up on November 2, 2023 regarding history of leukocytosis/neutrophilia.  MPN studies were negative for myeloproliferative disorders but TET 2 was positive suggestive of possible myelodysplastic syndrome/CHIP.  Discussed with patient explained to her that 1 could consider a bone marrow aspiration biopsy to evaluate further but since she is asymptomatic there would be no treatment the patient declined.  She is agreeable for clinical follow-up return in 6 months.       Orders Placed This Encounter   Procedures    CBC and Auto Differential     Standing Status:   Standing     Number of Occurrences:   3     Standing Expiration Date:   5/2/2025     Order Specific Question:   Release result to [employer]     Answer:   Immediate [1]    Comprehensive Metabolic Panel     Standing Status:   Standing     Number of Occurrences:   3     Standing Expiration Date:   5/2/2025     Order Specific Question:   Release result to [employer]     Answer:   Immediate [1]                  There are no  diagnoses linked to this encounter.           Roger Schmitz MD

## 2023-11-02 NOTE — LETTER
November 2, 2023     Ariana Escalera MD  [address] [facility] [address]    Patient: Liliana Bhardwaj   YOB: 1955   Date of Visit: 11/2/2023       Dear Dr. Ariana Escalera MD:    Thank you for referring Liliana Bhardwaj to me for evaluation. Below are my notes for this consultation.  If you have questions, please do not hesitate to call me. I look forward to following your patient along with you.       Sincerely,     Vandana Thorpe MD      CC: No Recipients  ______________________________________________________________________________________    New Patient  Transition of Care-previous rheum Muoh  PCP:  Jw  See scanned history form      Subjective  Patient ID: Liliana Bhardwaj is a 68 y.o. female who presents for Osteoporosis and Arthritis.  Pt reports she was referred her for osteoporosis.  Pt states she was on fosamax but stopped it a long time ago (still on her med list).  Pt doesn't recall why she stopped but was on it when her DXA still showed osteopenia.  DXA in Dec 2022 showed osteoporosis but hasn't started any thing to treat.    Pt also reports she has arthritis in her hands for years.  Pt was on plaquenil for 10 years but has been off since she stopped seeing previous rheum.   She was on methotrexate for awhile but doesn't know why that was stopped.   Pt thinks she did well on plaquenil and wants to restart med.  Pt last saw eye doctor in January   Patient Active Problem List    Diagnosis Date Noted   • Encounter for monitoring of hydroxychloroquine therapy 11/02/2023   • Arthropathy 10/06/2023   • Osteoarthritis 10/06/2023   • Seronegative arthropathy of multiple sites (CMS/HCC) 10/06/2023   • Benign essential hypertension 10/06/2023   • Coronary artery disease 10/06/2023   • Depression, major, in remission (CMS/HCC) 10/06/2023   • Fatigue 10/06/2023   • Generalized anxiety disorder 10/06/2023   • High serum parathyroid hormone (PTH) 10/06/2023   • Mixed hyperlipidemia 10/06/2023   •  CALEB on CPAP 10/06/2023   • Osteoporosis 10/06/2023   • Psoriasis 10/06/2023   • Sarcoidosis of lung (CMS/HCC) 10/06/2023   • Sarcoidosis 10/06/2023   • Shortness of breath at rest 10/06/2023   • Type 2 diabetes mellitus with other diabetic kidney complication (CMS/HCC) 10/06/2023   • Vitamin D deficiency 10/06/2023   • Bradycardia 10/06/2023   • Diarrhea, secretory 10/06/2023   • Insomnia 10/06/2023   • Microalbuminuria due to type 2 diabetes mellitus (CMS/HCC) 10/06/2023   • Neutrophilic leukocytosis 10/06/2023   • Overweight with body mass index (BMI) of 28 to 28.9 in adult 10/06/2023   • Closed fracture of proximal end of left humerus with routine healing 04/10/2020   • Hypertension 02/07/2014   • Diabetic neuropathy (CMS/HCC) 02/07/2014   • Compulsive skin picking 02/07/2014     Past Medical History:   Diagnosis Date   • Acquired hemophilia (CMS/HCC)     Acquired hemophilia   • COVID-19 06/07/2021    COVID-19 virus infection   • Left humeral fracture 06/01/2020    Fracture of left humerus   • Old myocardial infarction     History of myocardial infarction   • Sarcoidosis of lung (CMS/HCC)     Sarcoidosis, lung     Past Surgical History:   Procedure Laterality Date   • ANKLE FRACTURE SURGERY  06/01/2020    Ankle fracture repair   • CARPAL TUNNEL RELEASE  06/01/2020    Carpal tunnel surgery   • CATARACT EXTRACTION  06/01/2020    Cataract surgery   • CHOLECYSTECTOMY  06/01/2020    Cholecystectomy   • COLONOSCOPY  09/25/2020    Complete colonoscopy   • CORONARY ARTERY BYPASS GRAFT  06/01/2020    Coronary artery bypass graft   • LUMBAR DISCECTOMY     • LUNG BIOPSY  06/01/2020    Lung biopsy   • SPINAL FUSION       Current Outpatient Medications   Medication Instructions   • aspirin 81 mg, oral, Daily RT   • atenolol (Tenormin) 25 mg tablet 1 tablet, oral, Daily   • calcium carbonate 600 mg calcium (1,500 mg) tablet 2 tablets, oral, Daily   • cetirizine (ZYRTEC) 10 mg, oral, Daily   • cholecalciferol (Vitamin D-3) 50  MCG (2000 UT) tablet 1 tablet, oral, 2 times daily   • cholestyramine (Questran) 4 gram packet 4 g, oral, Daily, Mix the contents of 1 pwd packet qith 2.6 oz of noncarbinated eduard and swallow once daily   • clobetasol (Temovate) 0.05 % gel Topical, 2 times daily   • clobetasol (Temovate) 0.05 % ointment 0.05 Applications, Topical, 2 times daily   • dapagliflozin propanediol (Farxiga) 10 mg 1 tablet, oral, Every morning   • doxepin 3 mg tablet 1 tablet, oral, Nightly PRN   • hydrocortisone 2.5 % cream  Apply to the affected areas twice a day for up to two weeks. Cover with a moisturizer   • hydroxychloroquine (PLAQUENIL) 200 mg, oral, 2 times daily   • Lactobacillus acidophilus (PROBIOTIC ORAL) oral, Use as directed   • Lantus Solostar U-100 Insulin 100 unit/mL (3 mL) pen 1.2 Units/kg, subcutaneous, 2 times daily   • lisinopril 10 mg tablet 1 tablet, oral, Daily   • lovastatin (Mevacor) 10 mg tablet 1 tablet, oral, Every evening   • meloxicam (Mobic) 15 mg tablet 1 tablet, oral, Daily PRN   • metFORMIN (GLUCOPHAGE) 500 mg, oral, Daily with breakfast   • metoprolol tartrate (LOPRESSOR) 25 mg, oral, Daily RT   • multivitamin with minerals iron-free (Adults 50 Plus) 1 tablet, oral, Daily   • Ozempic 0.5 mg, subcutaneous, Weekly   • Ozempic 2 mg, subcutaneous, Weekly   • pen needle, diabetic (BD ULTRA-FINE RALPH PEN NEEDLE MISC) miscellaneous, Use as directed   • saccharomyces boulardii (FLORASTOR) 250 mg, oral, 2 times daily   • suvorexant (BELSOMRA) 10 mg, oral, Nightly   • triamcinolone (Kenalog) 0.1 % ointment Topical   • triamterene-hydrochlorothiazid (Dyazide) 37.5-25 mg capsule 1 capsule, oral, Daily   • venlafaxine XR (EFFEXOR-XR) 150 mg, oral, Daily RT, With food     Allergies   Allergen Reactions   • Azathioprine Unknown   • Ciprofloxacin Unknown   • Montelukast Unknown   • Nitrofurantoin Hives and Rash   • Nitrofurantoin Macrocrystal Unknown   • Penicillins Hives   • Sulfa (Sulfonamide Antibiotics) Hives   •  "Zafirluisidorost Rash       Review of Systems   Constitutional:  Positive for fatigue, fever and unexpected weight change. Negative for chills.   HENT:  Positive for nosebleeds and sore throat. Negative for congestion, ear pain, hearing loss, mouth sores, postnasal drip, rhinorrhea, tinnitus and trouble swallowing.         Oral ulcers   Eyes:  Negative for photophobia, pain, discharge, redness, itching and visual disturbance.        Dry eye   Respiratory:  Positive for cough and shortness of breath. Negative for chest tightness.    Cardiovascular:  Negative for chest pain, palpitations and leg swelling.   Gastrointestinal:  Positive for diarrhea and nausea. Negative for abdominal pain, constipation and vomiting.   Endocrine: Negative for polydipsia and polyuria.   Genitourinary:  Negative for difficulty urinating, dysuria, flank pain and hematuria.   Musculoskeletal:  Positive for arthralgias, joint swelling and myalgias. Negative for back pain.   Skin:  Negative for color change and rash.   Allergic/Immunologic: Negative for immunocompromised state.   Neurological:  Positive for numbness. Negative for dizziness, syncope, weakness, light-headedness and headaches.   Hematological:  Negative for adenopathy. Bruises/bleeds easily.   Psychiatric/Behavioral:  Positive for dysphoric mood and sleep disturbance. The patient is nervous/anxious.        Objective /60   Pulse 71   Temp 35.9 °C (96.7 °F)   Ht 1.676 m (5' 6\")   Wt 73.4 kg (161 lb 14.4 oz)   BMI 26.13 kg/m²     Physical Exam  Vitals reviewed.   Constitutional:       General: She is not in acute distress.     Appearance: Normal appearance.   HENT:      Head: Normocephalic and atraumatic.      Right Ear: External ear normal.      Left Ear: External ear normal.      Nose: Nose normal.   Eyes:      Conjunctiva/sclera: Conjunctivae normal.   Pulmonary:      Effort: Pulmonary effort is normal. No respiratory distress.   Musculoskeletal:         General: Swelling " and deformity present. No tenderness.      Right lower leg: No edema.      Left lower leg: No edema.      Comments: Angulation deformities of thumbs and fingers.   No synovitis   Skin:     Findings: No erythema or rash.   Neurological:      General: No focal deficit present.      Mental Status: She is alert.   Psychiatric:         Mood and Affect: Mood normal.       Lab Results   Component Value Date    GLUCOSE 126 (H) 09/14/2023    CALCIUM 9.9 09/14/2023     (L) 09/14/2023    K 3.5 09/14/2023    CO2 28 09/14/2023    CL 94 (L) 09/14/2023    BUN 19 09/14/2023    CREATININE 1.11 (H) 09/14/2023     Lab Results   Component Value Date    ALT 14 09/14/2023    AST 19 09/14/2023    ALKPHOS 110 09/14/2023    BILITOT 0.6 09/14/2023     Lab Results   Component Value Date    WBC 13.9 (H) 09/14/2023    HGB 12.8 09/14/2023    HCT 40.9 09/14/2023    MCV 89 09/14/2023     09/14/2023         Assessment/Plan  Problem List Items Addressed This Visit             ICD-10-CM    Seronegative arthropathy of multiple sites (CMS/[facility]) - Primary M06.09     Seronegative arthritis most likely due to sarcoid.   Will need to review her whole medical record to get a clearer picture of her history.  In meantime, will restart plaquenil   Labs ordered to assess ACE level and inflammatory markers         Relevant Medications    hydroxychloroquine (Plaquenil) 200 mg tablet    Other Relevant Orders    C-Reactive Protein    Sedimentation Rate    Angiotensin Converting Enzyme    Osteoporosis M81.0     Will need to review all DXAs and determine timetable of med use to decide on optimal treatment         Sarcoidosis of lung (CMS/[facility]) D86.0     Lung disease stable         Sarcoidosis D86.9    Relevant Orders    C-Reactive Protein    Sedimentation Rate    Angiotensin Converting Enzyme    Diabetic neuropathy (CMS/[facility]) E11.40     Monitored by PCP         Encounter for monitoring of hydroxychloroquine therapy Z51.81, Z79.899     You are on chronic  plaquenil.     Make sure you see your eye doctor yearly.  If you need an eye doctor, let me know and I can place a referral    Plaquenil is dosed based on your weight.   We will make sure your dose is below the maximum daily dose of 5mg/kg           RESOLVED: On methotrexate therapy Z79.631

## 2023-11-02 NOTE — PROGRESS NOTES
New Patient  Transition of Care-previous rheum Muoh  PCP:  Jw  See scanned history form      Subjective   Patient ID: Liliana Bhardwaj is a 68 y.o. female who presents for Osteoporosis and Arthritis.  Pt reports she was referred her for osteoporosis.  Pt states she was on fosamax but stopped it a long time ago (still on her med list).  Pt doesn't recall why she stopped but was on it when her DXA still showed osteopenia.  DXA in Dec 2022 showed osteoporosis but hasn't started any thing to treat.    Pt also reports she has arthritis in her hands for years.  Pt was on plaquenil for 10 years but has been off since she stopped seeing previous rheum.   She was on methotrexate for awhile but doesn't know why that was stopped.   Pt thinks she did well on plaquenil and wants to restart med.  Pt last saw eye doctor in January   Patient Active Problem List    Diagnosis Date Noted    Encounter for monitoring of hydroxychloroquine therapy 11/02/2023    Arthropathy 10/06/2023    Osteoarthritis 10/06/2023    Seronegative arthropathy of multiple sites (CMS/HCC) 10/06/2023    Benign essential hypertension 10/06/2023    Coronary artery disease 10/06/2023    Depression, major, in remission (CMS/[facility]) 10/06/2023    Fatigue 10/06/2023    Generalized anxiety disorder 10/06/2023    High serum parathyroid hormone (PTH) 10/06/2023    Mixed hyperlipidemia 10/06/2023    CALEB on CPAP 10/06/2023    Osteoporosis 10/06/2023    Psoriasis 10/06/2023    Sarcoidosis of lung (CMS/[facility]) 10/06/2023    Sarcoidosis 10/06/2023    Shortness of breath at rest 10/06/2023    Type 2 diabetes mellitus with other diabetic kidney complication (CMS/[facility]) 10/06/2023    Vitamin D deficiency 10/06/2023    Bradycardia 10/06/2023    Diarrhea, secretory 10/06/2023    Insomnia 10/06/2023    Microalbuminuria due to type 2 diabetes mellitus (CMS/[facility]) 10/06/2023    Neutrophilic leukocytosis 10/06/2023    Overweight with body mass index (BMI) of 28 to 28.9 in adult 10/06/2023     Closed fracture of proximal end of left humerus with routine healing 04/10/2020    Hypertension 02/07/2014    Diabetic neuropathy (CMS/HCC) 02/07/2014    Compulsive skin picking 02/07/2014     Past Medical History:   Diagnosis Date    Acquired hemophilia (CMS/[facility])     Acquired hemophilia    COVID-19 06/07/2021    COVID-19 virus infection    Left humeral fracture 06/01/2020    Fracture of left humerus    Old myocardial infarction     History of myocardial infarction    Sarcoidosis of lung (CMS/[facility])     Sarcoidosis, lung     Past Surgical History:   Procedure Laterality Date    ANKLE FRACTURE SURGERY  06/01/2020    Ankle fracture repair    CARPAL TUNNEL RELEASE  06/01/2020    Carpal tunnel surgery    CATARACT EXTRACTION  06/01/2020    Cataract surgery    CHOLECYSTECTOMY  06/01/2020    Cholecystectomy    COLONOSCOPY  09/25/2020    Complete colonoscopy    CORONARY ARTERY BYPASS GRAFT  06/01/2020    Coronary artery bypass graft    LUMBAR DISCECTOMY      LUNG BIOPSY  06/01/2020    Lung biopsy    SPINAL FUSION       Current Outpatient Medications   Medication Instructions    aspirin 81 mg, oral, Daily RT    atenolol (Tenormin) 25 mg tablet 1 tablet, oral, Daily    calcium carbonate 600 mg calcium (1,500 mg) tablet 2 tablets, oral, Daily    cetirizine (ZYRTEC) 10 mg, oral, Daily    cholecalciferol (Vitamin D-3) 50 MCG (2000 UT) tablet 1 tablet, oral, 2 times daily    cholestyramine (Questran) 4 gram packet 4 g, oral, Daily, Mix the contents of 1 pwd packet qith 2.6 oz of noncarbinated eduard and swallow once daily    clobetasol (Temovate) 0.05 % gel Topical, 2 times daily    clobetasol (Temovate) 0.05 % ointment 0.05 Applications, Topical, 2 times daily    dapagliflozin propanediol (Farxiga) 10 mg 1 tablet, oral, Every morning    doxepin 3 mg tablet 1 tablet, oral, Nightly PRN    hydrocortisone 2.5 % cream  Apply to the affected areas twice a day for up to two weeks. Cover with a moisturizer    hydroxychloroquine (PLAQUENIL)  200 mg, oral, 2 times daily    Lactobacillus acidophilus (PROBIOTIC ORAL) oral, Use as directed    Lantus Solostar U-100 Insulin 100 unit/mL (3 mL) pen 1.2 Units/kg, subcutaneous, 2 times daily    lisinopril 10 mg tablet 1 tablet, oral, Daily    lovastatin (Mevacor) 10 mg tablet 1 tablet, oral, Every evening    meloxicam (Mobic) 15 mg tablet 1 tablet, oral, Daily PRN    metFORMIN (GLUCOPHAGE) 500 mg, oral, Daily with breakfast    metoprolol tartrate (LOPRESSOR) 25 mg, oral, Daily RT    multivitamin with minerals iron-free (Adults 50 Plus) 1 tablet, oral, Daily    Ozempic 0.5 mg, subcutaneous, Weekly    Ozempic 2 mg, subcutaneous, Weekly    pen needle, diabetic (BD ULTRA-FINE RALPH PEN NEEDLE MISC) miscellaneous, Use as directed    saccharomyces boulardii (FLORASTOR) 250 mg, oral, 2 times daily    suvorexant (BELSOMRA) 10 mg, oral, Nightly    triamcinolone (Kenalog) 0.1 % ointment Topical    triamterene-hydrochlorothiazid (Dyazide) 37.5-25 mg capsule 1 capsule, oral, Daily    venlafaxine XR (EFFEXOR-XR) 150 mg, oral, Daily RT, With food     Allergies   Allergen Reactions    Azathioprine Unknown    Ciprofloxacin Unknown    Montelukast Unknown    Nitrofurantoin Hives and Rash    Nitrofurantoin Macrocrystal Unknown    Penicillins Hives    Sulfa (Sulfonamide Antibiotics) Hives    Zafirlukast Rash       Review of Systems   Constitutional:  Positive for fatigue, fever and unexpected weight change. Negative for chills.   HENT:  Positive for nosebleeds and sore throat. Negative for congestion, ear pain, hearing loss, mouth sores, postnasal drip, rhinorrhea, tinnitus and trouble swallowing.         Oral ulcers   Eyes:  Negative for photophobia, pain, discharge, redness, itching and visual disturbance.        Dry eye   Respiratory:  Positive for cough and shortness of breath. Negative for chest tightness.    Cardiovascular:  Negative for chest pain, palpitations and leg swelling.   Gastrointestinal:  Positive for diarrhea and  "nausea. Negative for abdominal pain, constipation and vomiting.   Endocrine: Negative for polydipsia and polyuria.   Genitourinary:  Negative for difficulty urinating, dysuria, flank pain and hematuria.   Musculoskeletal:  Positive for arthralgias, joint swelling and myalgias. Negative for back pain.   Skin:  Negative for color change and rash.   Allergic/Immunologic: Negative for immunocompromised state.   Neurological:  Positive for numbness. Negative for dizziness, syncope, weakness, light-headedness and headaches.   Hematological:  Negative for adenopathy. Bruises/bleeds easily.   Psychiatric/Behavioral:  Positive for dysphoric mood and sleep disturbance. The patient is nervous/anxious.        Objective  /60   Pulse 71   Temp 35.9 °C (96.7 °F)   Ht 1.676 m (5' 6\")   Wt 73.4 kg (161 lb 14.4 oz)   BMI 26.13 kg/m²     Physical Exam  Vitals reviewed.   Constitutional:       General: She is not in acute distress.     Appearance: Normal appearance.   HENT:      Head: Normocephalic and atraumatic.      Right Ear: External ear normal.      Left Ear: External ear normal.      Nose: Nose normal.   Eyes:      Conjunctiva/sclera: Conjunctivae normal.   Pulmonary:      Effort: Pulmonary effort is normal. No respiratory distress.   Musculoskeletal:         General: Swelling and deformity present. No tenderness.      Right lower leg: No edema.      Left lower leg: No edema.      Comments: Angulation deformities of thumbs and fingers.   No synovitis   Skin:     Findings: No erythema or rash.   Neurological:      General: No focal deficit present.      Mental Status: She is alert.   Psychiatric:         Mood and Affect: Mood normal.       Lab Results   Component Value Date    GLUCOSE 126 (H) 09/14/2023    CALCIUM 9.9 09/14/2023     (L) 09/14/2023    K 3.5 09/14/2023    CO2 28 09/14/2023    CL 94 (L) 09/14/2023    BUN 19 09/14/2023    CREATININE 1.11 (H) 09/14/2023     Lab Results   Component Value Date    ALT 14 " 09/14/2023    AST 19 09/14/2023    ALKPHOS 110 09/14/2023    BILITOT 0.6 09/14/2023     Lab Results   Component Value Date    WBC 13.9 (H) 09/14/2023    HGB 12.8 09/14/2023    HCT 40.9 09/14/2023    MCV 89 09/14/2023     09/14/2023         Assessment/Plan   Problem List Items Addressed This Visit             ICD-10-CM    Seronegative arthropathy of multiple sites (CMS/[facility]) - Primary M06.09     Seronegative arthritis most likely due to sarcoid.   Will need to review her whole medical record to get a clearer picture of her history.  In meantime, will restart plaquenil   Labs ordered to assess ACE level and inflammatory markers         Relevant Medications    hydroxychloroquine (Plaquenil) 200 mg tablet    Other Relevant Orders    C-Reactive Protein    Sedimentation Rate    Angiotensin Converting Enzyme    Osteoporosis M81.0     Will need to review all DXAs and determine timetable of med use to decide on optimal treatment         Sarcoidosis of lung (CMS/[facility]) D86.0     Lung disease stable         Sarcoidosis D86.9    Relevant Orders    C-Reactive Protein    Sedimentation Rate    Angiotensin Converting Enzyme    Diabetic neuropathy (CMS/[facility]) E11.40     Monitored by PCP         Encounter for monitoring of hydroxychloroquine therapy Z51.81, Z79.899     You are on chronic plaquenil.     Make sure you see your eye doctor yearly.  If you need an eye doctor, let me know and I can place a referral    Plaquenil is dosed based on your weight.   We will make sure your dose is below the maximum daily dose of 5mg/kg           RESOLVED: On methotrexate therapy Z79.631

## 2023-11-02 NOTE — ASSESSMENT & PLAN NOTE
Seronegative arthritis most likely due to sarcoid.   Will need to review her whole medical record to get a clearer picture of her history.  In meantime, will restart plaquenil   Labs ordered to assess ACE level and inflammatory markers

## 2023-11-03 LAB
CRP SERPL-MCNC: 2.36 MG/DL
ERYTHROCYTE [SEDIMENTATION RATE] IN BLOOD BY WESTERGREN METHOD: 18 MM/H (ref 0–30)

## 2023-11-04 LAB — ACE SERPL-CCNC: <10 U/L (ref 16–85)

## 2023-11-16 ENCOUNTER — APPOINTMENT (OUTPATIENT)
Dept: HEMATOLOGY/ONCOLOGY | Facility: CLINIC | Age: 68
End: 2023-11-16
Payer: COMMERCIAL

## 2023-11-28 DIAGNOSIS — M81.0 AGE-RELATED OSTEOPOROSIS WITHOUT CURRENT PATHOLOGICAL FRACTURE: Primary | ICD-10-CM

## 2023-11-28 RX ORDER — ALENDRONATE SODIUM 70 MG/1
70 TABLET ORAL
Qty: 4 TABLET | Refills: 1 | Status: SHIPPED | OUTPATIENT
Start: 2023-11-28 | End: 2023-11-28 | Stop reason: SDUPTHER

## 2023-11-28 RX ORDER — ALENDRONATE SODIUM 70 MG/1
70 TABLET ORAL
Qty: 12 TABLET | Refills: 3 | Status: SHIPPED | OUTPATIENT
Start: 2023-11-28 | End: 2024-11-27

## 2023-12-05 ENCOUNTER — TELEPHONE (OUTPATIENT)
Dept: PRIMARY CARE | Facility: CLINIC | Age: 68
End: 2023-12-05
Payer: COMMERCIAL

## 2023-12-05 NOTE — TELEPHONE ENCOUNTER
Pt wants to get an xray downstairs today.    Pt may have broken left arm  Was broken before within the last 2 yrs.  She was at dr. Bagley office - automatic bp machine may have done it at her last visit a month ago.  Very painful when touched  or rolls over on it.    Please advise.    Pt [phone]

## 2023-12-08 NOTE — TELEPHONE ENCOUNTER
Xray down stairs called.  (Adry)  Pt showed up for xray.    I read her dr's reply and that I had called her twice - but her mail box is not set up.    Adry relayed the message to the pt .. Advising her to go to ED for evaluation.

## 2023-12-14 ENCOUNTER — LAB (OUTPATIENT)
Dept: LAB | Facility: LAB | Age: 68
End: 2023-12-14
Payer: COMMERCIAL

## 2023-12-14 ENCOUNTER — OFFICE VISIT (OUTPATIENT)
Dept: PRIMARY CARE | Facility: CLINIC | Age: 68
End: 2023-12-14
Payer: COMMERCIAL

## 2023-12-14 VITALS
TEMPERATURE: 98.1 F | OXYGEN SATURATION: 98 % | SYSTOLIC BLOOD PRESSURE: 134 MMHG | DIASTOLIC BLOOD PRESSURE: 65 MMHG | BODY MASS INDEX: 25.49 KG/M2 | HEIGHT: 66 IN | HEART RATE: 78 BPM | WEIGHT: 158.6 LBS

## 2023-12-14 DIAGNOSIS — I10 BENIGN ESSENTIAL HYPERTENSION: ICD-10-CM

## 2023-12-14 DIAGNOSIS — D72.9 NEUTROPHILIC LEUKOCYTOSIS: ICD-10-CM

## 2023-12-14 DIAGNOSIS — E11.29 TYPE 2 DIABETES MELLITUS WITH OTHER DIABETIC KIDNEY COMPLICATION (MULTI): ICD-10-CM

## 2023-12-14 DIAGNOSIS — I10 BENIGN ESSENTIAL HYPERTENSION: Primary | ICD-10-CM

## 2023-12-14 PROCEDURE — 3078F DIAST BP <80 MM HG: CPT | Performed by: INTERNAL MEDICINE

## 2023-12-14 PROCEDURE — 1126F AMNT PAIN NOTED NONE PRSNT: CPT | Performed by: INTERNAL MEDICINE

## 2023-12-14 PROCEDURE — 4010F ACE/ARB THERAPY RXD/TAKEN: CPT | Performed by: INTERNAL MEDICINE

## 2023-12-14 PROCEDURE — 85025 COMPLETE CBC W/AUTO DIFF WBC: CPT

## 2023-12-14 PROCEDURE — 3066F NEPHROPATHY DOC TX: CPT | Performed by: INTERNAL MEDICINE

## 2023-12-14 PROCEDURE — 1159F MED LIST DOCD IN RCRD: CPT | Performed by: INTERNAL MEDICINE

## 2023-12-14 PROCEDURE — 80053 COMPREHEN METABOLIC PANEL: CPT

## 2023-12-14 PROCEDURE — 3075F SYST BP GE 130 - 139MM HG: CPT | Performed by: INTERNAL MEDICINE

## 2023-12-14 PROCEDURE — 1036F TOBACCO NON-USER: CPT | Performed by: INTERNAL MEDICINE

## 2023-12-14 PROCEDURE — 83036 HEMOGLOBIN GLYCOSYLATED A1C: CPT

## 2023-12-14 PROCEDURE — 99213 OFFICE O/P EST LOW 20 MIN: CPT | Performed by: INTERNAL MEDICINE

## 2023-12-14 PROCEDURE — 1160F RVW MEDS BY RX/DR IN RCRD: CPT | Performed by: INTERNAL MEDICINE

## 2023-12-14 PROCEDURE — 36415 COLL VENOUS BLD VENIPUNCTURE: CPT

## 2023-12-14 PROCEDURE — 3044F HG A1C LEVEL LT 7.0%: CPT | Performed by: INTERNAL MEDICINE

## 2023-12-14 RX ORDER — LIDOCAINE 50 MG/G
1 PATCH TOPICAL
COMMUNITY
Start: 2023-12-14

## 2023-12-14 ASSESSMENT — ENCOUNTER SYMPTOMS
HEMATURIA: 0
COUGH: 0
HEADACHES: 0
SHORTNESS OF BREATH: 0
CONSTIPATION: 0
FEVER: 0
DIZZINESS: 0
LIGHT-HEADEDNESS: 0
VOMITING: 0
PALPITATIONS: 0
ARTHRALGIAS: 1
ABDOMINAL PAIN: 0
BACK PAIN: 0
CONFUSION: 0
DYSURIA: 0
NAUSEA: 0
CHILLS: 0

## 2023-12-14 NOTE — PROGRESS NOTES
"Subjective   Patient ID: Liliana Bhardwaj is a 68 y.o. female who presents for Diabetes.    HPI     Going to be having left shoulder replacement soon.  Not sleeping well at night due to pain - has been worse over the last 6 weeks or so.    Blood sugar averaging 150's.  She would like to have A1c and CBC rechecked today.  Otherwise no new complaints.     Review of Systems   Constitutional:  Negative for chills and fever.   Respiratory:  Negative for cough and shortness of breath.    Cardiovascular:  Negative for chest pain, palpitations and leg swelling.   Gastrointestinal:  Negative for abdominal pain, constipation, nausea and vomiting.        Chronic secretory diarrhea controlled with cholestyramine   Genitourinary:  Negative for dysuria and hematuria.   Musculoskeletal:  Positive for arthralgias. Negative for back pain and gait problem.   Skin:  Negative for rash.   Neurological:  Negative for dizziness, light-headedness and headaches.   Psychiatric/Behavioral:  Negative for confusion.        Objective   /65   Pulse 78   Temp 36.7 °C (98.1 °F)   Ht 1.676 m (5' 6\")   Wt 71.9 kg (158 lb 9.6 oz)   SpO2 98%   BMI 25.60 kg/m²     Physical Exam  Constitutional:       Appearance: Normal appearance.   HENT:      Head: Normocephalic and atraumatic.   Cardiovascular:      Rate and Rhythm: Normal rate and regular rhythm.      Heart sounds: No murmur heard.  Pulmonary:      Effort: Pulmonary effort is normal. No respiratory distress.      Breath sounds: Normal breath sounds. No wheezing.   Musculoskeletal:      Right lower leg: No edema.      Left lower leg: No edema.      Comments: Guarding of left shoulder / upper arm   Skin:     Findings: No rash.   Neurological:      General: No focal deficit present.      Mental Status: She is alert and oriented to person, place, and time. Mental status is at baseline.   Psychiatric:         Mood and Affect: Mood normal.         Behavior: Behavior normal.         Thought " Content: Thought content normal.         Judgment: Judgment normal.         Assessment/Plan   Problem List Items Addressed This Visit             ICD-10-CM    Benign essential hypertension - Primary I10    Relevant Orders    CBC and Auto Differential    Type 2 diabetes mellitus with other diabetic kidney complication (CMS/HCC) E11.29    Relevant Orders    Hemoglobin A1c    Neutrophilic leukocytosis D72.9        DM2 - has been well controlled lately.  Continue current medications and check A1c.    Hypertension - at goal, continue current medications.    Leukocytosis - has been seeing Dr. Schmitz.  Recheck CBC/diff.    Follow-up in 3 months and PRN.

## 2023-12-15 LAB
ALBUMIN SERPL BCP-MCNC: 4.7 G/DL (ref 3.4–5)
ALP SERPL-CCNC: 82 U/L (ref 33–136)
ALT SERPL W P-5'-P-CCNC: 13 U/L (ref 7–45)
ANION GAP SERPL CALC-SCNC: 15 MMOL/L (ref 10–20)
AST SERPL W P-5'-P-CCNC: 21 U/L (ref 9–39)
BASOPHILS # BLD AUTO: 0.03 X10*3/UL (ref 0–0.1)
BASOPHILS NFR BLD AUTO: 0.3 %
BILIRUB SERPL-MCNC: 0.4 MG/DL (ref 0–1.2)
BUN SERPL-MCNC: 17 MG/DL (ref 6–23)
CALCIUM SERPL-MCNC: 10 MG/DL (ref 8.6–10.6)
CHLORIDE SERPL-SCNC: 97 MMOL/L (ref 98–107)
CO2 SERPL-SCNC: 28 MMOL/L (ref 21–32)
CREAT SERPL-MCNC: 0.99 MG/DL (ref 0.5–1.05)
EOSINOPHIL # BLD AUTO: 0.01 X10*3/UL (ref 0–0.7)
EOSINOPHIL NFR BLD AUTO: 0.1 %
ERYTHROCYTE [DISTWIDTH] IN BLOOD BY AUTOMATED COUNT: 14 % (ref 11.5–14.5)
EST. AVERAGE GLUCOSE BLD GHB EST-MCNC: 137 MG/DL
GFR SERPL CREATININE-BSD FRML MDRD: 62 ML/MIN/1.73M*2
GLUCOSE SERPL-MCNC: 117 MG/DL (ref 74–99)
HBA1C MFR BLD: 6.4 %
HCT VFR BLD AUTO: 41.8 % (ref 36–46)
HGB BLD-MCNC: 13.2 G/DL (ref 12–16)
IMM GRANULOCYTES # BLD AUTO: 0.04 X10*3/UL (ref 0–0.7)
IMM GRANULOCYTES NFR BLD AUTO: 0.3 % (ref 0–0.9)
LYMPHOCYTES # BLD AUTO: 2.38 X10*3/UL (ref 1.2–4.8)
LYMPHOCYTES NFR BLD AUTO: 19.9 %
MCH RBC QN AUTO: 27.4 PG (ref 26–34)
MCHC RBC AUTO-ENTMCNC: 31.6 G/DL (ref 32–36)
MCV RBC AUTO: 87 FL (ref 80–100)
MONOCYTES # BLD AUTO: 1.11 X10*3/UL (ref 0.1–1)
MONOCYTES NFR BLD AUTO: 9.3 %
NEUTROPHILS # BLD AUTO: 8.38 X10*3/UL (ref 1.2–7.7)
NEUTROPHILS NFR BLD AUTO: 70.1 %
NRBC BLD-RTO: 0 /100 WBCS (ref 0–0)
PLATELET # BLD AUTO: 308 X10*3/UL (ref 150–450)
POTASSIUM SERPL-SCNC: 3.4 MMOL/L (ref 3.5–5.3)
PROT SERPL-MCNC: 7.3 G/DL (ref 6.4–8.2)
RBC # BLD AUTO: 4.82 X10*6/UL (ref 4–5.2)
SODIUM SERPL-SCNC: 137 MMOL/L (ref 136–145)
WBC # BLD AUTO: 12 X10*3/UL (ref 4.4–11.3)

## 2024-01-04 DIAGNOSIS — E11.29 TYPE 2 DIABETES MELLITUS WITH OTHER DIABETIC KIDNEY COMPLICATION (MULTI): ICD-10-CM

## 2024-01-04 DIAGNOSIS — I10 BENIGN ESSENTIAL HYPERTENSION: Primary | ICD-10-CM

## 2024-01-04 RX ORDER — LISINOPRIL 10 MG/1
10 TABLET ORAL DAILY
Qty: 90 TABLET | Refills: 3 | Status: SHIPPED | OUTPATIENT
Start: 2024-01-04

## 2024-01-04 RX ORDER — DAPAGLIFLOZIN 10 MG/1
10 TABLET, FILM COATED ORAL
Qty: 90 TABLET | Refills: 3 | Status: SHIPPED | OUTPATIENT
Start: 2024-01-04

## 2024-02-15 ENCOUNTER — TELEPHONE (OUTPATIENT)
Dept: PRIMARY CARE | Facility: CLINIC | Age: 69
End: 2024-02-15
Payer: COMMERCIAL

## 2024-02-15 NOTE — TELEPHONE ENCOUNTER
Patient left message on rx line requesting 90 day rx refills-  Lantus 28 units at night  Cholestyramine 4 mg packets q daily  Ph-[facility]  Patient phone [phone].

## 2024-02-16 DIAGNOSIS — E78.2 MIXED HYPERLIPIDEMIA: ICD-10-CM

## 2024-02-16 DIAGNOSIS — E11.29 TYPE 2 DIABETES MELLITUS WITH OTHER DIABETIC KIDNEY COMPLICATION (MULTI): Primary | ICD-10-CM

## 2024-02-16 RX ORDER — CHOLESTYRAMINE 4 G/9G
1 POWDER, FOR SUSPENSION ORAL DAILY
Qty: 90 PACKET | Refills: 3 | Status: SHIPPED | OUTPATIENT
Start: 2024-02-16

## 2024-02-16 RX ORDER — INSULIN GLARGINE 100 [IU]/ML
28 INJECTION, SOLUTION SUBCUTANEOUS NIGHTLY
Qty: 27 ML | Refills: 3 | Status: SHIPPED | OUTPATIENT
Start: 2024-02-16

## 2024-03-14 ENCOUNTER — APPOINTMENT (OUTPATIENT)
Dept: PRIMARY CARE | Facility: CLINIC | Age: 69
End: 2024-03-14
Payer: COMMERCIAL

## 2024-04-04 ENCOUNTER — APPOINTMENT (OUTPATIENT)
Dept: RHEUMATOLOGY | Facility: CLINIC | Age: 69
End: 2024-04-04
Payer: COMMERCIAL

## 2024-05-02 ENCOUNTER — APPOINTMENT (OUTPATIENT)
Dept: HEMATOLOGY/ONCOLOGY | Facility: CLINIC | Age: 69
End: 2024-05-02
Payer: COMMERCIAL

## 2024-05-08 DIAGNOSIS — F32.5 DEPRESSION, MAJOR, IN REMISSION (CMS-HCC): Primary | ICD-10-CM

## 2024-05-09 DIAGNOSIS — M15.9 PRIMARY OSTEOARTHRITIS INVOLVING MULTIPLE JOINTS: Primary | ICD-10-CM

## 2024-05-09 DIAGNOSIS — E78.2 MIXED HYPERLIPIDEMIA: ICD-10-CM

## 2024-05-09 RX ORDER — MELOXICAM 15 MG/1
15 TABLET ORAL DAILY PRN
Qty: 90 TABLET | Refills: 3 | Status: SHIPPED | OUTPATIENT
Start: 2024-05-09

## 2024-05-09 RX ORDER — LOVASTATIN 10 MG/1
10 TABLET ORAL NIGHTLY
Qty: 90 TABLET | Refills: 3 | Status: SHIPPED | OUTPATIENT
Start: 2024-05-09

## 2024-05-09 RX ORDER — VENLAFAXINE HYDROCHLORIDE 150 MG/1
CAPSULE, EXTENDED RELEASE ORAL
Qty: 90 CAPSULE | Refills: 3 | Status: SHIPPED | OUTPATIENT
Start: 2024-05-09

## 2024-05-10 DIAGNOSIS — E11.29 TYPE 2 DIABETES MELLITUS WITH OTHER DIABETIC KIDNEY COMPLICATION (MULTI): Primary | ICD-10-CM

## 2024-05-13 RX ORDER — SEMAGLUTIDE 1.34 MG/ML
1 INJECTION, SOLUTION SUBCUTANEOUS
Qty: 9 ML | Refills: 3 | Status: SHIPPED | OUTPATIENT
Start: 2024-05-19

## 2024-06-19 DIAGNOSIS — I10 BENIGN ESSENTIAL HYPERTENSION: Primary | ICD-10-CM

## 2024-06-20 RX ORDER — TRIAMTERENE AND HYDROCHLOROTHIAZIDE 37.5; 25 MG/1; MG/1
1 CAPSULE ORAL DAILY
Qty: 90 CAPSULE | Refills: 3 | Status: SHIPPED | OUTPATIENT
Start: 2024-06-20

## 2024-06-25 ENCOUNTER — TELEPHONE (OUTPATIENT)
Dept: PRIMARY CARE | Facility: CLINIC | Age: 69
End: 2024-06-25

## 2024-06-25 ENCOUNTER — APPOINTMENT (OUTPATIENT)
Dept: PRIMARY CARE | Facility: CLINIC | Age: 69
End: 2024-06-25
Payer: COMMERCIAL

## 2024-06-25 DIAGNOSIS — N30.90 CYSTITIS: Primary | ICD-10-CM

## 2024-06-25 NOTE — TELEPHONE ENCOUNTER
Ami with [facility] left a voicemail patient seen at [facility] and had a positive urine culture.  The results were faxed to our office.  Ami confirming we received the results and asking if patient started on any treatment.  Ami phone [phone].

## 2024-06-26 RX ORDER — CLARITHROMYCIN 500 MG/1
500 TABLET, FILM COATED ORAL 2 TIMES DAILY
Qty: 14 TABLET | Refills: 0 | Status: SHIPPED | OUTPATIENT
Start: 2024-06-26 | End: 2024-07-03

## 2024-06-26 NOTE — TELEPHONE ENCOUNTER
[facility]alera message on [facility] voicemail.

## 2024-07-06 DIAGNOSIS — E11.29 TYPE 2 DIABETES MELLITUS WITH OTHER DIABETIC KIDNEY COMPLICATION (MULTI): ICD-10-CM

## 2024-07-08 RX ORDER — INSULIN GLARGINE 100 [IU]/ML
INJECTION, SOLUTION SUBCUTANEOUS
Qty: 30 ML | Refills: 3 | Status: SHIPPED | OUTPATIENT
Start: 2024-07-08

## 2024-07-22 DIAGNOSIS — E78.2 MIXED HYPERLIPIDEMIA: ICD-10-CM

## 2024-07-22 RX ORDER — CHOLESTYRAMINE 4 G/9G
POWDER, FOR SUSPENSION ORAL
Qty: 120 EACH | Refills: 3 | Status: SHIPPED | OUTPATIENT
Start: 2024-07-22

## 2024-08-16 DIAGNOSIS — M81.0 AGE-RELATED OSTEOPOROSIS WITHOUT CURRENT PATHOLOGICAL FRACTURE: ICD-10-CM

## 2024-08-17 RX ORDER — ALENDRONATE SODIUM 70 MG/1
TABLET ORAL
Qty: 12 TABLET | Refills: 3 | Status: SHIPPED | OUTPATIENT
Start: 2024-08-17

## 2024-08-28 DIAGNOSIS — M06.09 SERONEGATIVE ARTHROPATHY OF MULTIPLE SITES (MULTI): ICD-10-CM

## 2024-08-29 RX ORDER — HYDROXYCHLOROQUINE SULFATE 200 MG/1
TABLET, FILM COATED ORAL 2 TIMES DAILY
Qty: 180 TABLET | Refills: 3 | OUTPATIENT
Start: 2024-08-29

## 2024-09-30 ASSESSMENT — DERMATOLOGY QUALITY OF LIFE (QOL) ASSESSMENT
WHAT SINGLE SKIN CONDITION LISTED BELOW IS THE PATIENT ANSWERING THE QUALITY-OF-LIFE ASSESSMENT QUESTIONS ABOUT: PSORIASIS
RATE HOW BOTHERED YOU ARE BY SYMPTOMS OF YOUR SKIN PROBLEM (EG, ITCHING, STINGING BURNING, HURTING OR SKIN IRRITATION): 6 - ALWAYS BOTHERED
RATE HOW EMOTIONALLY BOTHERED YOU ARE BY YOUR SKIN PROBLEM (FOR EXAMPLE, WORRY, EMBARRASSMENT, FRUSTRATION): 6 - ALWAYS BOTHERED
RATE HOW EMOTIONALLY BOTHERED YOU ARE BY YOUR SKIN PROBLEM (FOR EXAMPLE, WORRY, EMBARRASSMENT, FRUSTRATION): 6 - ALWAYS BOTHERED
DATE THE QUALITY-OF-LIFE ASSESSMENT WAS COMPLETED: 67113
RATE HOW BOTHERED YOU ARE BY EFFECTS OF YOUR SKIN PROBLEMS ON YOUR ACTIVITIES (EG, GOING OUT, ACCOMPLISHING WHAT YOU WANT, WORK ACTIVITIES OR YOUR RELATIONSHIPS WITH OTHERS): 5
RATE HOW BOTHERED YOU ARE BY SYMPTOMS OF YOUR SKIN PROBLEM (EG, ITCHING, STINGING BURNING, HURTING OR SKIN IRRITATION): 6 - ALWAYS BOTHERED
RATE HOW BOTHERED YOU ARE BY EFFECTS OF YOUR SKIN PROBLEMS ON YOUR ACTIVITIES (EG, GOING OUT, ACCOMPLISHING WHAT YOU WANT, WORK ACTIVITIES OR YOUR RELATIONSHIPS WITH OTHERS): 5
WHAT SINGLE SKIN CONDITION LISTED BELOW IS THE PATIENT ANSWERING THE QUALITY-OF-LIFE ASSESSMENT QUESTIONS ABOUT: PSORIASIS

## 2024-10-03 ENCOUNTER — LAB (OUTPATIENT)
Dept: LAB | Facility: LAB | Age: 69
End: 2024-10-03
Payer: COMMERCIAL

## 2024-10-03 ENCOUNTER — APPOINTMENT (OUTPATIENT)
Dept: DERMATOLOGY | Facility: CLINIC | Age: 69
End: 2024-10-03
Payer: COMMERCIAL

## 2024-10-03 DIAGNOSIS — L40.9 PSORIASIS: Primary | ICD-10-CM

## 2024-10-03 DIAGNOSIS — L40.9 PSORIASIS: ICD-10-CM

## 2024-10-03 LAB
ALBUMIN SERPL BCP-MCNC: 4.4 G/DL (ref 3.4–5)
ALP SERPL-CCNC: 79 U/L (ref 33–136)
ALT SERPL W P-5'-P-CCNC: 11 U/L (ref 7–45)
ANION GAP SERPL CALC-SCNC: 13 MMOL/L (ref 10–20)
AST SERPL W P-5'-P-CCNC: 20 U/L (ref 9–39)
BASOPHILS # BLD AUTO: 0.06 X10*3/UL (ref 0–0.1)
BASOPHILS NFR BLD AUTO: 0.5 %
BILIRUB SERPL-MCNC: 0.5 MG/DL (ref 0–1.2)
BUN SERPL-MCNC: 18 MG/DL (ref 6–23)
CALCIUM SERPL-MCNC: 9.4 MG/DL (ref 8.6–10.6)
CHLORIDE SERPL-SCNC: 97 MMOL/L (ref 98–107)
CO2 SERPL-SCNC: 29 MMOL/L (ref 21–32)
CREAT SERPL-MCNC: 0.84 MG/DL (ref 0.5–1.05)
EGFRCR SERPLBLD CKD-EPI 2021: 75 ML/MIN/1.73M*2
EOSINOPHIL # BLD AUTO: 0.16 X10*3/UL (ref 0–0.7)
EOSINOPHIL NFR BLD AUTO: 1.4 %
ERYTHROCYTE [DISTWIDTH] IN BLOOD BY AUTOMATED COUNT: 13 % (ref 11.5–14.5)
GLUCOSE SERPL-MCNC: 95 MG/DL (ref 74–99)
HBV CORE AB SER QL: NONREACTIVE
HBV SURFACE AB SER-ACNC: <3.1 MIU/ML
HBV SURFACE AG SERPL QL IA: NONREACTIVE
HCT VFR BLD AUTO: 39.4 % (ref 36–46)
HCV AB SER QL: NONREACTIVE
HGB BLD-MCNC: 13.1 G/DL (ref 12–16)
IMM GRANULOCYTES # BLD AUTO: 0.05 X10*3/UL (ref 0–0.7)
IMM GRANULOCYTES NFR BLD AUTO: 0.4 % (ref 0–0.9)
LYMPHOCYTES # BLD AUTO: 2 X10*3/UL (ref 1.2–4.8)
LYMPHOCYTES NFR BLD AUTO: 17.8 %
MCH RBC QN AUTO: 28.3 PG (ref 26–34)
MCHC RBC AUTO-ENTMCNC: 33.2 G/DL (ref 32–36)
MCV RBC AUTO: 85 FL (ref 80–100)
MONOCYTES # BLD AUTO: 1 X10*3/UL (ref 0.1–1)
MONOCYTES NFR BLD AUTO: 8.9 %
NEUTROPHILS # BLD AUTO: 7.94 X10*3/UL (ref 1.2–7.7)
NEUTROPHILS NFR BLD AUTO: 71 %
NRBC BLD-RTO: 0 /100 WBCS (ref 0–0)
PLATELET # BLD AUTO: 342 X10*3/UL (ref 150–450)
POTASSIUM SERPL-SCNC: 3.6 MMOL/L (ref 3.5–5.3)
PROT SERPL-MCNC: 6.9 G/DL (ref 6.4–8.2)
RBC # BLD AUTO: 4.63 X10*6/UL (ref 4–5.2)
SODIUM SERPL-SCNC: 135 MMOL/L (ref 136–145)
WBC # BLD AUTO: 11.2 X10*3/UL (ref 4.4–11.3)

## 2024-10-03 PROCEDURE — 86704 HEP B CORE ANTIBODY TOTAL: CPT

## 2024-10-03 PROCEDURE — 1160F RVW MEDS BY RX/DR IN RCRD: CPT | Performed by: NURSE PRACTITIONER

## 2024-10-03 PROCEDURE — 1159F MED LIST DOCD IN RCRD: CPT | Performed by: NURSE PRACTITIONER

## 2024-10-03 PROCEDURE — 87340 HEPATITIS B SURFACE AG IA: CPT

## 2024-10-03 PROCEDURE — 99214 OFFICE O/P EST MOD 30 MIN: CPT | Performed by: NURSE PRACTITIONER

## 2024-10-03 PROCEDURE — 36415 COLL VENOUS BLD VENIPUNCTURE: CPT

## 2024-10-03 PROCEDURE — 4010F ACE/ARB THERAPY RXD/TAKEN: CPT | Performed by: NURSE PRACTITIONER

## 2024-10-03 PROCEDURE — 80053 COMPREHEN METABOLIC PANEL: CPT

## 2024-10-03 PROCEDURE — 85025 COMPLETE CBC W/AUTO DIFF WBC: CPT

## 2024-10-03 PROCEDURE — 86481 TB AG RESPONSE T-CELL SUSP: CPT

## 2024-10-03 PROCEDURE — 86706 HEP B SURFACE ANTIBODY: CPT

## 2024-10-03 PROCEDURE — 86803 HEPATITIS C AB TEST: CPT

## 2024-10-03 NOTE — PROGRESS NOTES
Subjective   Liliana Bhardwaj is a 69 y.o. female who presents for the following: Psoriasis.  Psoriasis  Symptoms have been ongoing for about several years and have been rapidly worsening. The patient reports symptoms of itching, joint pain, and joint stiffness, primarily affecting the scalp, genitals. Lesions appear to be exacerbated by no known precipitant. Treatments tried so far include  multiple topicals and Skyrizi (noted to be effective, treatment was longstanding) , with excellent improvement. History of other significant skin problems: no. Family History of skin disease: no.    PMHX significant for sarcoidosis x 40 years.  Patient was following with dermatology outside of  system, stated she was treated with Skyrizi on a long-term basis. Reports new joint pain, states joints were not affected under Skyrizi treatment.         Objective   Well appearing patient in no apparent distress; mood and affect are within normal limits.    A focused examination was performed including extremities, including the arms, hands, fingers, and fingernails and the legs, feet, toes, and toenails, head, including the scalp, face, neck, nose, ears, eyelids, and lips, and chest, axillae, abdomen, back, and buttocks. All findings within normal limits unless otherwise noted below.    Well-demarcated erythematous papules and plaques with overlying silvery scale.     Body surface area:  35%   Joint abnormalities present            Assessment/Plan   Psoriasis    - Psoriasis is a common, noncontagious condition that can present in a variety of ways in the skin. The subtypes of this condition include plaque, inverse (or skin fold), guttate, erythrodermic, and pustular psoriasis. Plaque psoriasis, which represents approximately 85% of psoriasis cases, is a lifelong skin condition that affects about 2%-3% of the population worldwide. Plaque psoriasis skin lesions are typically red and raised with overlying scale. There may be papules  "(small, raised bumps) or plaques (larger, raised skin lesions that are bigger than a thumbnail), or both. People with plaque psoriasis typically have thickened, white scaly patches on their skin.  - While the exact cause of psoriasis is unknown, this condition is the result of an overactive immune system that attacks the skin and other organs of the body. Psoriasis is very common in some families, suggesting a likely genetic component contributing to this disease, but it can also occur in individuals with no family history of psoriasis. Psoriasis can be triggered by certain environmental causes, such as emotional stress, pregnancy, injury to the skin, bacterial skin infections such as a streptococcal infection (\"strep\"), smoking or alcohol consumption, and ingesting certain medications.  - Because plaque psoriasis is a lifelong, chronic condition that currently has no cure, the goal of treatment is to decrease the number of skin lesions and reduce symptoms such as itching and pain. Most beneficial treatments for plaque psoriasis work in part due to their ability to alleviate the body's abnormal immune attack of the skin and help prevent the excessive buildup of skin cells or flakes.  - Bathe daily to help soften scales and moisten the skin. Avoid harsh soaps and scrubbing the skin as these may worsen psoriasis. Moisturizing soaps and soap substitutes, such as unscented Dove Sensitive Skin Beauty Bar, Vanicream Cleansing Bar, and CeraVe Psoriasis Cleanser, are milder products for the skin.  - The application of moisturizers after water exposure or bathing may be helpful. Heavier oil-based moisturizers help to retain water in the skin better than water-based moisturizers. Thicker moisturizers such as petroleum jelly (Vaseline), Aquaphor Healing Ointment, - Eucerin Original Healing Cream, Vanicream, Aveeno Moisturizing Cream, CeraVe Healing Ointment, or CeraVe Moisturizing Cream can be applied to damp skin daily after " bathing. Use cream and ointments rather than lotions because lotions can dry out the skin.  - Apply over-the-counter hydrocortisone cream or ointment (0.5% or 1%) twice daily for 2-3 weeks at a time to help reduce itch and irritation. Stronger topical steroids are typically required for thicker psoriasis plaques. Long-term use of topical steroids should include periodic times of no treatment each month to avoid thinning of the skin.  - Use of products with salicylic acid (shampoos, cleansers, and ointments), such as Neutrogena T/Sal, can help soften and remove thick psoriasis scale in the scalp.  - Small doses of natural sunlight may be helpful, such as 10-15 minutes approximately 2 or 3 times per week. Avoid too much sun; however, and protect your healthy skin from excessive sun exposure to help prevent premature aging of the skin and skin cancers.  - Follow a healthy diet to maintain an ideal weight. (Being overweight may make plaque psoriasis worse.)  Patient Support Resources  - The National Psoriasis Foundation (https://www.psoriasis.org/) is a useful resource for patients and health professionals that has additional information regarding psoriasis and the various available treatments. The National Psoriasis Foundation website includes access to psoriasis-related articles, psoriasis research, a directory of health care professionals with an expertise in psoriasis, and opportunities for patients to volunteer or get involved in upcoming events.  Plan  - Discussed treatment options with MsDorota Bhardwaj. Will start Skyrizi pending lab results.   - Labs ordered; Tspot, Hep B/C serologies, CBC, CMP  - Discussed risks, benefits, and side effects of medications.     Related Procedures  T-Spot TB  Comprehensive Metabolic Panel  CBC and Auto Differential  Hepatitis B Surface Antibody  Hepatitis B Surface Antigen  Hepatitis C Antibody  Hepatitis B Core Antibody, Total    Follow up in 4 months. Please call me if there are any  changes or development of concerning symptoms (lesion/skin condition is changing, bleeding, enlarging, or worsening).

## 2024-10-06 LAB
NIL(NEG) CONTROL SPOT COUNT: NORMAL
PANEL A SPOT COUNT: 0
PANEL B SPOT COUNT: 0
POS CONTROL SPOT COUNT: NORMAL
T-SPOT. TB INTERPRETATION: NEGATIVE

## 2024-10-07 DIAGNOSIS — L40.9 PSORIASIS: ICD-10-CM

## 2024-10-07 RX ORDER — HYDROCORTISONE 25 MG/G
CREAM TOPICAL
Qty: 60 G | Refills: 2 | Status: SHIPPED | OUTPATIENT
Start: 2024-10-07

## 2024-10-08 DIAGNOSIS — L40.9 PSORIASIS: Primary | ICD-10-CM

## 2024-10-08 RX ORDER — RISANKIZUMAB-RZAA 150 MG/ML
150 INJECTION SUBCUTANEOUS SEE ADMIN INSTRUCTIONS
Qty: 2 ML | Refills: 0 | Status: SHIPPED | OUTPATIENT
Start: 2024-10-08

## 2024-10-08 RX ORDER — RISANKIZUMAB-RZAA 150 MG/ML
150 INJECTION SUBCUTANEOUS SEE ADMIN INSTRUCTIONS
Qty: 1 ML | Refills: 3 | Status: SHIPPED | OUTPATIENT
Start: 2024-10-08

## 2024-10-09 ENCOUNTER — SPECIALTY PHARMACY (OUTPATIENT)
Dept: PHARMACY | Facility: CLINIC | Age: 69
End: 2024-10-09

## 2024-10-14 ENCOUNTER — TELEPHONE (OUTPATIENT)
Dept: DERMATOLOGY | Facility: CLINIC | Age: 69
End: 2024-10-14
Payer: COMMERCIAL

## 2024-10-14 NOTE — TELEPHONE ENCOUNTER
Pt contacted office stating that her Hydrocortisone 2.5% cream was sent into the wrong pharmacy and she needs it sent to the [facility] in [facility]. According to pt's record, Sue Mayne sent the Rx to the pt's preferred pharmacy on 10/07/2024. Contacted pharmacy at this time and they stated that the Rx is ready for pickup. LVM for pt to notify her that Rx is at the pharmacy she requested and ready for pickup. Call back number left.     Alexandra Krishnamurthy RN

## 2024-10-15 DIAGNOSIS — L40.9 PSORIASIS: ICD-10-CM

## 2024-10-15 RX ORDER — RISANKIZUMAB-RZAA 150 MG/ML
150 INJECTION SUBCUTANEOUS
Qty: 1 ML | Refills: 3 | Status: SHIPPED | OUTPATIENT
Start: 2024-10-15

## 2024-10-15 RX ORDER — RISANKIZUMAB-RZAA 150 MG/ML
150 INJECTION SUBCUTANEOUS SEE ADMIN INSTRUCTIONS
Qty: 2 ML | Refills: 0 | Status: SHIPPED | OUTPATIENT
Start: 2024-10-15

## 2024-10-15 NOTE — PROGRESS NOTES
[place] prior authorization approved until 4/14/25. Patient must fill with [facility].     Clinical pharmacist routed Skyrizi prescriptions to [facility] per insurance mandate. Baseline labs WNL.

## 2024-11-11 ENCOUNTER — APPOINTMENT (OUTPATIENT)
Dept: PRIMARY CARE | Facility: CLINIC | Age: 69
End: 2024-11-11
Payer: COMMERCIAL

## 2024-11-13 ENCOUNTER — TELEPHONE (OUTPATIENT)
Dept: DERMATOLOGY | Facility: CLINIC | Age: 69
End: 2024-11-13
Payer: COMMERCIAL

## 2024-11-13 NOTE — TELEPHONE ENCOUNTER
Pt contacted office requesting an update on her Skyrizi as she has not yet received her first dose. Spoke to  [facility] and was told the Rx was sent to [facility] on 10/15. Called and left  notifying pt to reach out to [facility] regarding her prescription at [phone]. Advised pt to contact office with any questions or concerns.     Alexandra Krishnamurthy RN

## 2024-11-21 ENCOUNTER — TELEPHONE (OUTPATIENT)
Dept: RHEUMATOLOGY | Facility: CLINIC | Age: 69
End: 2024-11-21
Payer: COMMERCIAL

## 2024-11-21 DIAGNOSIS — I10 BENIGN ESSENTIAL HYPERTENSION: ICD-10-CM

## 2024-11-21 DIAGNOSIS — E11.29 TYPE 2 DIABETES MELLITUS WITH OTHER DIABETIC KIDNEY COMPLICATION: ICD-10-CM

## 2024-11-21 RX ORDER — DAPAGLIFLOZIN 10 MG/1
10 TABLET, FILM COATED ORAL
Qty: 90 TABLET | Refills: 3 | Status: SHIPPED | OUTPATIENT
Start: 2024-11-21

## 2024-11-25 ENCOUNTER — TELEPHONE (OUTPATIENT)
Dept: PRIMARY CARE | Facility: CLINIC | Age: 69
End: 2024-11-25
Payer: COMMERCIAL

## 2024-11-25 DIAGNOSIS — I25.10 CORONARY ARTERY DISEASE INVOLVING NATIVE CORONARY ARTERY OF NATIVE HEART, UNSPECIFIED WHETHER ANGINA PRESENT: ICD-10-CM

## 2024-11-25 DIAGNOSIS — I10 BENIGN ESSENTIAL HYPERTENSION: Primary | ICD-10-CM

## 2024-11-25 RX ORDER — LISINOPRIL 10 MG/1
10 TABLET ORAL DAILY
Qty: 90 TABLET | Refills: 3 | Status: SHIPPED | OUTPATIENT
Start: 2024-11-25

## 2024-11-25 RX ORDER — ATENOLOL 25 MG/1
25 TABLET ORAL DAILY
Qty: 90 TABLET | Refills: 3 | Status: SHIPPED | OUTPATIENT
Start: 2024-11-25

## 2025-01-08 ENCOUNTER — APPOINTMENT (OUTPATIENT)
Dept: PRIMARY CARE | Facility: CLINIC | Age: 70
End: 2025-01-08
Payer: COMMERCIAL

## 2025-01-08 VITALS
OXYGEN SATURATION: 98 % | WEIGHT: 144.3 LBS | BODY MASS INDEX: 24.63 KG/M2 | HEIGHT: 64 IN | DIASTOLIC BLOOD PRESSURE: 62 MMHG | SYSTOLIC BLOOD PRESSURE: 124 MMHG | HEART RATE: 76 BPM | TEMPERATURE: 97.3 F

## 2025-01-08 DIAGNOSIS — Z78.0 ASYMPTOMATIC MENOPAUSE: ICD-10-CM

## 2025-01-08 DIAGNOSIS — F32.5 DEPRESSION, MAJOR, IN REMISSION (CMS-HCC): ICD-10-CM

## 2025-01-08 DIAGNOSIS — E78.2 MIXED HYPERLIPIDEMIA: ICD-10-CM

## 2025-01-08 DIAGNOSIS — E11.29 TYPE 2 DIABETES MELLITUS WITH OTHER DIABETIC KIDNEY COMPLICATION: ICD-10-CM

## 2025-01-08 DIAGNOSIS — Z53.20 MAMMOGRAM DECLINED: ICD-10-CM

## 2025-01-08 DIAGNOSIS — I10 BENIGN ESSENTIAL HYPERTENSION: ICD-10-CM

## 2025-01-08 DIAGNOSIS — Z00.00 HEALTHCARE MAINTENANCE: Primary | ICD-10-CM

## 2025-01-08 PROCEDURE — 1036F TOBACCO NON-USER: CPT | Performed by: INTERNAL MEDICINE

## 2025-01-08 PROCEDURE — 3008F BODY MASS INDEX DOCD: CPT | Performed by: INTERNAL MEDICINE

## 2025-01-08 PROCEDURE — 4010F ACE/ARB THERAPY RXD/TAKEN: CPT | Performed by: INTERNAL MEDICINE

## 2025-01-08 PROCEDURE — 99397 PER PM REEVAL EST PAT 65+ YR: CPT | Performed by: INTERNAL MEDICINE

## 2025-01-08 PROCEDURE — 1159F MED LIST DOCD IN RCRD: CPT | Performed by: INTERNAL MEDICINE

## 2025-01-08 PROCEDURE — 1160F RVW MEDS BY RX/DR IN RCRD: CPT | Performed by: INTERNAL MEDICINE

## 2025-01-08 PROCEDURE — 3074F SYST BP LT 130 MM HG: CPT | Performed by: INTERNAL MEDICINE

## 2025-01-08 PROCEDURE — 3078F DIAST BP <80 MM HG: CPT | Performed by: INTERNAL MEDICINE

## 2025-01-08 RX ORDER — PEN NEEDLE, DIABETIC 30 GX3/16"
NEEDLE, DISPOSABLE MISCELLANEOUS
Qty: 100 EACH | Refills: 3 | Status: SHIPPED | OUTPATIENT
Start: 2025-01-08

## 2025-01-08 ASSESSMENT — ENCOUNTER SYMPTOMS
ABDOMINAL PAIN: 0
BACK PAIN: 0
PALPITATIONS: 0
NAUSEA: 0
FEVER: 0
HEADACHES: 0
ARTHRALGIAS: 0
LIGHT-HEADEDNESS: 0
SHORTNESS OF BREATH: 0
DYSPHORIC MOOD: 0
CONSTIPATION: 0
DYSURIA: 0
DIZZINESS: 0
CHILLS: 0
HEMATURIA: 0
CONFUSION: 0
VOMITING: 0
COUGH: 0

## 2025-01-08 ASSESSMENT — PROMIS GLOBAL HEALTH SCALE
RATE_MENTAL_HEALTH: GOOD
CARRYOUT_PHYSICAL_ACTIVITIES: MODERATELY
RATE_AVERAGE_PAIN: 3
RATE_PHYSICAL_HEALTH: GOOD
RATE_GENERAL_HEALTH: GOOD
RATE_SOCIAL_SATISFACTION: GOOD
EMOTIONAL_PROBLEMS: SOMETIMES
RATE_QUALITY_OF_LIFE: VERY GOOD
CARRYOUT_SOCIAL_ACTIVITIES: GOOD
RATE_AVERAGE_FATIGUE: MODERATE

## 2025-01-08 ASSESSMENT — PATIENT HEALTH QUESTIONNAIRE - PHQ9
2. FEELING DOWN, DEPRESSED OR HOPELESS: NOT AT ALL
1. LITTLE INTEREST OR PLEASURE IN DOING THINGS: NOT AT ALL
SUM OF ALL RESPONSES TO PHQ9 QUESTIONS 1 AND 2: 0

## 2025-01-08 NOTE — PROGRESS NOTES
"CHIEF COMPLAINT  Annual Exam    HISTORY OF PRESENT ILLNESS  Liliana Bhardwaj is a 69 y.o. female presents today for follow up of Annual Exam    HPI    Past Medical, Surgical, and Family History reviewed and updated in chart.  Reviewed all medications by prescribing practitioner or clinical pharmacist (such as prescriptions, OTCs, herbal therapies and supplements) and documented in the medical record.    Left shoulder replacement in July - states it went very well.  Stopped alendronate - \"made me sick.\"  Back on Skyrizi per dermatologist.  Due for fasting labs.  Blood sugar is well controlled.  Declines cancer screenings.     REVIEW OF SYSTEMS  Review of Systems   Constitutional:  Negative for chills and fever.   Respiratory:  Negative for cough and shortness of breath.    Cardiovascular:  Negative for chest pain, palpitations and leg swelling.   Gastrointestinal:  Negative for abdominal pain, constipation, nausea and vomiting.        Chronic secretory diarrhea controlled with cholestyramine   Genitourinary:  Negative for dysuria and hematuria.   Musculoskeletal:  Negative for arthralgias, back pain and gait problem.   Skin:  Negative for rash.   Neurological:  Negative for dizziness, light-headedness and headaches.   Psychiatric/Behavioral:  Negative for confusion and dysphoric mood.        ALLERGIES  Azathioprine, Ciprofloxacin, Montelukast, Nitrofurantoin, Nitrofurantoin macrocrystal, Penicillins, Sulfa (sulfonamide antibiotics), and Zafirlukast    MEDICATIONS  Current Outpatient Medications   Medication Instructions    aspirin 81 mg, Daily RT    atenolol (TENORMIN) 25 mg, oral, Daily, as directed    calcium carbonate 600 mg calcium (1,500 mg) tablet 2 tablets, Daily    cholecalciferol (Vitamin D-3) 50 MCG (2000 UT) tablet 1 tablet, 2 times daily    cholestyramine (Questran) 4 gram packet MIX THE CONTENTS OF 1 PACKET BY  MOUTH WITH 2.6 OUNCE OF  NONCARBONATED CARLYN AND SWALLOW  ONCE DAILY    clobetasol (Temovate) 0.05 " "% gel 2 times daily    dapagliflozin propanediol (FARXIGA) 10 mg, oral, Daily before breakfast    doxepin 3 mg tablet 1 tablet, Nightly PRN    hydrocortisone 2.5 % cream Apply to the affected areas twice a day for up to two weeks.    Lactobacillus acidophilus (PROBIOTIC ORAL) Take by mouth. Use as directed    Lantus Solostar U-100 Insulin 100 unit/mL (3 mL) pen INJECT 28 UNITS SUBCUTANEOUSLY  ONCE DAILY AT BEDTIME    lidocaine (Lidoderm) 5 % patch 1 patch, Daily RT    lisinopril 10 mg, oral, Daily    lovastatin (MEVACOR) 10 mg, oral, Nightly    meloxicam (MOBIC) 15 mg, oral, Daily PRN    multivitamin with minerals iron-free (Adults 50 Plus) 1 tablet, Daily    Ozempic 1 mg, subcutaneous, Once Weekly    pen needle, diabetic (BD Ultra-Fine Stephany Pen Needle) 32 gauge x 5/32\" needle For use with insulin once daily.    saccharomyces boulardii (FLORASTOR) 250 mg, 2 times daily    Skyrizi 150 mg, subcutaneous, See admin instructions, Inject 1ml (150mg) under the skin at week 0 and week 4    triamcinolone (Kenalog) 0.1 % ointment Apply topically.    triamterene-hydrochlorothiazid (Dyazide) 37.5-25 mg capsule 1 capsule, oral, Daily    venlafaxine XR (Effexor-XR) 150 mg 24 hr capsule TAKE 1 CAPSULE BY MOUTH DAILY  WITH FOOD       TOBACCO USE  Social History     Tobacco Use   Smoking Status Former    Current packs/day: 0.00    Average packs/day: 5.0 packs/day for 17.0 years (85.0 ttl pk-yrs)    Types: Cigarettes    Quit date:     Years since quittin.0   Smokeless Tobacco Never   Tobacco Comments    5 pk/day for 17 years       DEPRESSION SCREEN  Over the past 2 weeks, how often have you been bothered by any of the following problems?  Little interest or pleasure in doing things: Not at all  Feeling down, depressed, or hopeless: Not at all    SURGICAL HISTORY  Past Surgical History:  2020: ANKLE FRACTURE SURGERY      Comment:  Ankle fracture repair  2020: CARPAL TUNNEL RELEASE      Comment:  Carpal tunnel " "surgery  06/01/2020: CATARACT EXTRACTION      Comment:  Cataract surgery  06/01/2020: CHOLECYSTECTOMY      Comment:  Cholecystectomy  09/25/2020: COLONOSCOPY      Comment:  Complete colonoscopy  06/01/2020: CORONARY ARTERY BYPASS GRAFT      Comment:  Coronary artery bypass graft  No date: LUMBAR DISCECTOMY  06/01/2020: LUNG BIOPSY      Comment:  Lung biopsy  No date: SPINAL FUSION  07/08/2024: TOTAL SHOULDER ARTHROPLASTY; Left      Comment:  Dr. Mosley at [facility]       OBJECTIVE    /62   Pulse 76   Temp 36.3 °C (97.3 °F)   Ht 1.626 m (5' 4\")   Wt 65.5 kg (144 lb 4.8 oz)   SpO2 98%   BMI 24.77 kg/m²    BMI: Estimated body mass index is 24.77 kg/m² as calculated from the following:    Height as of this encounter: 1.626 m (5' 4\").    Weight as of this encounter: 65.5 kg (144 lb 4.8 oz).    BP Readings from Last 3 Encounters:   01/08/25 124/62   12/14/23 134/65   11/02/23 111/60      Wt Readings from Last 3 Encounters:   01/08/25 65.5 kg (144 lb 4.8 oz)   12/14/23 71.9 kg (158 lb 9.6 oz)   11/02/23 73.4 kg (161 lb 14.4 oz)        PHYSICAL EXAM  Physical Exam  Constitutional:       Appearance: Normal appearance.   HENT:      Head: Normocephalic and atraumatic.      Right Ear: Tympanic membrane and ear canal normal.      Left Ear: Tympanic membrane and ear canal normal.   Neck:      Vascular: No carotid bruit.   Cardiovascular:      Rate and Rhythm: Normal rate and regular rhythm.      Heart sounds: No murmur heard.  Pulmonary:      Effort: Pulmonary effort is normal. No respiratory distress.      Breath sounds: Normal breath sounds. No wheezing.   Abdominal:      General: There is no distension.      Palpations: Abdomen is soft.      Tenderness: There is no abdominal tenderness.   Musculoskeletal:      Right lower leg: No edema.      Left lower leg: No edema.   Skin:     Findings: No rash.   Neurological:      General: No focal deficit present.      Mental Status: She is alert and oriented to person, " "place, and time. Mental status is at baseline.   Psychiatric:         Mood and Affect: Mood normal.         Behavior: Behavior normal.         Thought Content: Thought content normal.         Judgment: Judgment normal.          ASSESSMENT AND PLAN  Assessment/Plan   Problem List Items Addressed This Visit       Benign essential hypertension    Depression, major, in remission (CMS-HCC)    Overview     Stable on venlafaxine         Mixed hyperlipidemia    Type 2 diabetes mellitus with other diabetic kidney complication    Relevant Medications    pen needle, diabetic (BD Ultra-Fine Stephany Pen Needle) 32 gauge x 5/32\" needle    Other Relevant Orders    Albumin-Creatinine Ratio, Urine Random    Hemoglobin A1c    Healthcare maintenance - Primary    Relevant Orders    Lipid Panel    Comprehensive Metabolic Panel    CBC and Auto Differential    Mammogram declined     Other Visit Diagnoses       Asymptomatic menopause        Relevant Orders    XR DEXA bone density          Well Visit    Hypertension - at goal, continue current medication.    DM2 - well controlled on current medications  - check A1c, GFR, and urine microalcumin  - on ACE inhibitor and statin    Mixed hyperlipidemia with history of CAD - continue statin.    Routine labs - CBC, CMP, FLP.    Depression - stable on venlafaxine.    Osteoporosis - DEXA ordered.    Mammogram, Colon cancer screening - declined.    Prevnar 20 and Shingrix recommended.    Stay up to date with routine dental and eye exams.    Follow-up 6 months, sooner pending results.     "

## 2025-01-20 ENCOUNTER — LAB (OUTPATIENT)
Dept: LAB | Facility: LAB | Age: 70
End: 2025-01-20
Payer: COMMERCIAL

## 2025-01-20 ENCOUNTER — HOSPITAL ENCOUNTER (OUTPATIENT)
Dept: RADIOLOGY | Facility: CLINIC | Age: 70
Discharge: HOME | End: 2025-01-20
Payer: COMMERCIAL

## 2025-01-20 DIAGNOSIS — E11.29 TYPE 2 DIABETES MELLITUS WITH OTHER DIABETIC KIDNEY COMPLICATION: ICD-10-CM

## 2025-01-20 DIAGNOSIS — Z78.0 ASYMPTOMATIC MENOPAUSE: ICD-10-CM

## 2025-01-20 DIAGNOSIS — Z00.00 HEALTHCARE MAINTENANCE: ICD-10-CM

## 2025-01-20 PROCEDURE — 77080 DXA BONE DENSITY AXIAL: CPT

## 2025-01-20 PROCEDURE — 77080 DXA BONE DENSITY AXIAL: CPT | Performed by: RADIOLOGY

## 2025-01-21 LAB
ALBUMIN SERPL BCP-MCNC: 4.3 G/DL (ref 3.4–5)
ALP SERPL-CCNC: 76 U/L (ref 33–136)
ALT SERPL W P-5'-P-CCNC: 11 U/L (ref 7–45)
ANION GAP SERPL CALC-SCNC: 16 MMOL/L (ref 10–20)
AST SERPL W P-5'-P-CCNC: 17 U/L (ref 9–39)
BASOPHILS # BLD AUTO: 0.05 X10*3/UL (ref 0–0.1)
BASOPHILS NFR BLD AUTO: 0.5 %
BILIRUB SERPL-MCNC: 0.6 MG/DL (ref 0–1.2)
BUN SERPL-MCNC: 22 MG/DL (ref 6–23)
CALCIUM SERPL-MCNC: 9.5 MG/DL (ref 8.6–10.6)
CHLORIDE SERPL-SCNC: 99 MMOL/L (ref 98–107)
CHOLEST SERPL-MCNC: 180 MG/DL (ref 0–199)
CHOLESTEROL/HDL RATIO: 3.1
CO2 SERPL-SCNC: 24 MMOL/L (ref 21–32)
CREAT SERPL-MCNC: 0.91 MG/DL (ref 0.5–1.05)
CREAT UR-MCNC: 187.3 MG/DL (ref 20–320)
EGFRCR SERPLBLD CKD-EPI 2021: 68 ML/MIN/1.73M*2
EOSINOPHIL # BLD AUTO: 0.08 X10*3/UL (ref 0–0.7)
EOSINOPHIL NFR BLD AUTO: 0.8 %
ERYTHROCYTE [DISTWIDTH] IN BLOOD BY AUTOMATED COUNT: 13.7 % (ref 11.5–14.5)
EST. AVERAGE GLUCOSE BLD GHB EST-MCNC: 120 MG/DL
GLUCOSE SERPL-MCNC: 86 MG/DL (ref 74–99)
HBA1C MFR BLD: 5.8 %
HCT VFR BLD AUTO: 42.4 % (ref 36–46)
HDLC SERPL-MCNC: 58.2 MG/DL
HGB BLD-MCNC: 13.7 G/DL (ref 12–16)
IMM GRANULOCYTES # BLD AUTO: 0.05 X10*3/UL (ref 0–0.7)
IMM GRANULOCYTES NFR BLD AUTO: 0.5 % (ref 0–0.9)
LDLC SERPL CALC-MCNC: 100 MG/DL
LYMPHOCYTES # BLD AUTO: 2.86 X10*3/UL (ref 1.2–4.8)
LYMPHOCYTES NFR BLD AUTO: 27.2 %
MCH RBC QN AUTO: 28.2 PG (ref 26–34)
MCHC RBC AUTO-ENTMCNC: 32.3 G/DL (ref 32–36)
MCV RBC AUTO: 87 FL (ref 80–100)
MICROALBUMIN UR-MCNC: 13.3 MG/L
MICROALBUMIN/CREAT UR: 7.1 UG/MG CREAT
MONOCYTES # BLD AUTO: 0.91 X10*3/UL (ref 0.1–1)
MONOCYTES NFR BLD AUTO: 8.7 %
NEUTROPHILS # BLD AUTO: 6.57 X10*3/UL (ref 1.2–7.7)
NEUTROPHILS NFR BLD AUTO: 62.3 %
NON HDL CHOLESTEROL: 122 MG/DL (ref 0–149)
NRBC BLD-RTO: 0 /100 WBCS (ref 0–0)
PLATELET # BLD AUTO: 321 X10*3/UL (ref 150–450)
POTASSIUM SERPL-SCNC: 3.7 MMOL/L (ref 3.5–5.3)
PROT SERPL-MCNC: 7 G/DL (ref 6.4–8.2)
RBC # BLD AUTO: 4.85 X10*6/UL (ref 4–5.2)
SODIUM SERPL-SCNC: 135 MMOL/L (ref 136–145)
TRIGL SERPL-MCNC: 107 MG/DL (ref 0–149)
VLDL: 21 MG/DL (ref 0–40)
WBC # BLD AUTO: 10.5 X10*3/UL (ref 4.4–11.3)

## 2025-01-27 ENCOUNTER — APPOINTMENT (OUTPATIENT)
Dept: RHEUMATOLOGY | Facility: CLINIC | Age: 70
End: 2025-01-27
Payer: COMMERCIAL

## 2025-02-07 ENCOUNTER — TELEPHONE (OUTPATIENT)
Dept: DERMATOLOGY | Facility: CLINIC | Age: 70
End: 2025-02-07
Payer: COMMERCIAL

## 2025-02-07 DIAGNOSIS — L40.9 PSORIASIS: ICD-10-CM

## 2025-02-07 RX ORDER — RISANKIZUMAB-RZAA 150 MG/ML
150 INJECTION SUBCUTANEOUS SEE ADMIN INSTRUCTIONS
Qty: 1 ML | Refills: 1 | Status: SHIPPED | OUTPATIENT
Start: 2025-02-07

## 2025-02-07 RX ORDER — RISANKIZUMAB-RZAA 150 MG/ML
150 INJECTION SUBCUTANEOUS SEE ADMIN INSTRUCTIONS
Qty: 2 ML | Refills: 2 | Status: SHIPPED | OUTPATIENT
Start: 2025-02-07 | End: 2025-02-07

## 2025-02-07 NOTE — TELEPHONE ENCOUNTER
Pt contacted office stating that she doesn't have refills of her skyrizi.  Pt was directed to follow up in 4 months.  Upon chart review PA is approved until 04/2025.  Pt has appointment in April.  CNP messaged for refills and patient contacted and updated.     Pt will have to come in either in February 2025 or April 2025 for further refills.     Ruddy Reveels LPN

## 2025-03-09 DIAGNOSIS — E11.29 TYPE 2 DIABETES MELLITUS WITH OTHER DIABETIC KIDNEY COMPLICATION: ICD-10-CM

## 2025-03-10 RX ORDER — SEMAGLUTIDE 1.34 MG/ML
1 INJECTION, SOLUTION SUBCUTANEOUS
Qty: 9 ML | Refills: 3 | Status: SHIPPED | OUTPATIENT
Start: 2025-03-16

## 2025-04-07 ENCOUNTER — APPOINTMENT (OUTPATIENT)
Dept: DERMATOLOGY | Facility: CLINIC | Age: 70
End: 2025-04-07
Payer: COMMERCIAL

## 2025-04-07 DIAGNOSIS — L40.9 PSORIASIS: Primary | ICD-10-CM

## 2025-04-07 PROCEDURE — 1160F RVW MEDS BY RX/DR IN RCRD: CPT | Performed by: NURSE PRACTITIONER

## 2025-04-07 PROCEDURE — 3044F HG A1C LEVEL LT 7.0%: CPT | Performed by: NURSE PRACTITIONER

## 2025-04-07 PROCEDURE — 1036F TOBACCO NON-USER: CPT | Performed by: NURSE PRACTITIONER

## 2025-04-07 PROCEDURE — 4010F ACE/ARB THERAPY RXD/TAKEN: CPT | Performed by: NURSE PRACTITIONER

## 2025-04-07 PROCEDURE — 1159F MED LIST DOCD IN RCRD: CPT | Performed by: NURSE PRACTITIONER

## 2025-04-07 PROCEDURE — 3061F NEG MICROALBUMINURIA REV: CPT | Performed by: NURSE PRACTITIONER

## 2025-04-07 PROCEDURE — 99214 OFFICE O/P EST MOD 30 MIN: CPT | Performed by: NURSE PRACTITIONER

## 2025-04-07 PROCEDURE — 3049F LDL-C 100-129 MG/DL: CPT | Performed by: NURSE PRACTITIONER

## 2025-04-07 RX ORDER — TRIAMCINOLONE ACETONIDE 1 MG/G
OINTMENT TOPICAL
Qty: 80 G | Refills: 3 | Status: SHIPPED | OUTPATIENT
Start: 2025-04-07

## 2025-04-07 NOTE — PROGRESS NOTES
Subjective     Liliana Bhardwaj is a 69 y.o. female who presents for the following: Psoriasis (Pt presents to office for evaluation of skyrizi for psoriasis. Pt states scalp, ears, and groin still itchy. ).     Review of Systems:  No other skin or systemic complaints other than what is documented elsewhere in the note.    The following portions of the chart were reviewed this encounter and updated as appropriate:  Tobacco  Allergies  Meds  Problems  Med Hx  Surg Hx  Fam Hx       Skin Cancer History  No skin cancer on file.    Specialty Problems          Dermatology Problems    Psoriasis     - managed by Dr. Mayne  - on [facility] as of Jan 2025          Past Medical History:  Liliana Bhardwaj  has a past medical history of Acquired hemophilia (Multi), COVID-19 (06/07/2021), Left humeral fracture (06/01/2020), Old myocardial infarction, and Sarcoidosis of lung (Multi).    Past Surgical History:  Liliana Bhardwaj  has a past surgical history that includes Coronary artery bypass graft (06/01/2020); Carpal tunnel release (06/01/2020); Cholecystectomy (06/01/2020); Cataract extraction (06/01/2020); Lung biopsy (06/01/2020); Ankle fracture surgery (06/01/2020); Colonoscopy (09/25/2020); Lumbar discectomy; Spinal fusion; and Total shoulder arthroplasty (Left, 07/08/2024).    Family History:  Patient family history includes Alcohol abuse in her brother and father; Brain cancer in her father; Cancer in her brother; Coronary artery disease in her brother, father, and mother; Depression in her mother; Diabetes type II in her mother and paternal grandfather; Heart disease in her father and mother; Hyperlipidemia in her father; Hypertension in her mother; Miscarriages / Stillbirths in her mother.    Social History:  Liliana Bhardwaj  reports that she quit smoking about 19 years ago. Her smoking use included cigarettes. She has a 85 pack-year smoking history. She has never used smokeless tobacco. She reports that she does not  currently use alcohol. She reports that she does not currently use drugs.    Allergies:  Azathioprine, Ciprofloxacin, Montelukast, Nitrofurantoin, Nitrofurantoin macrocrystal, Penicillins, Sulfa (sulfonamide antibiotics), and Zafirlukast    Current Medications / CAM's:    Current Outpatient Medications:     aspirin 81 mg EC tablet, Take 1 tablet (81 mg) by mouth once daily., Disp: , Rfl:     atenolol (Tenormin) 25 mg tablet, TAKE 1 TABLET BY MOUTH DAILY AS  DIRECTED, Disp: 90 tablet, Rfl: 3    calcium carbonate 600 mg calcium (1,500 mg) tablet, Take 2 tablets (3,000 mg) by mouth once daily., Disp: , Rfl:     cholecalciferol (Vitamin D-3) 50 MCG (2000 UT) tablet, Take 1 tablet (2,000 Units) by mouth 2 times a day., Disp: , Rfl:     cholestyramine (Questran) 4 gram packet, MIX THE CONTENTS OF 1 PACKET BY  MOUTH WITH 2.6 OUNCE OF  NONCARBONATED CARLYN AND SWALLOW  ONCE DAILY, Disp: 120 each, Rfl: 3    clobetasol (Temovate) 0.05 % gel, Apply topically 2 times a day., Disp: , Rfl:     dapagliflozin propanediol (Farxiga) 10 mg, Take 1 tablet (10 mg) by mouth once daily in the morning. Take before meals., Disp: 90 tablet, Rfl: 3    doxepin 3 mg tablet, Take 1 tablet (3 mg) by mouth as needed at bedtime (sleep)., Disp: , Rfl:     hydrocortisone 2.5 % cream, Apply to the affected areas twice a day for up to two weeks., Disp: 60 g, Rfl: 2    Lactobacillus acidophilus (PROBIOTIC ORAL), Take by mouth. Use as directed, Disp: , Rfl:     Lantus Solostar U-100 Insulin 100 unit/mL (3 mL) pen, INJECT 28 UNITS SUBCUTANEOUSLY  ONCE DAILY AT BEDTIME, Disp: 30 mL, Rfl: 3    lidocaine (Lidoderm) 5 % patch, Place 1 patch on the skin once daily., Disp: , Rfl:     lisinopril 10 mg tablet, Take 1 tablet (10 mg) by mouth once daily., Disp: 90 tablet, Rfl: 3    lovastatin (Mevacor) 10 mg tablet, TAKE 1 TABLET BY MOUTH EVERY  EVENING, Disp: 90 tablet, Rfl: 3    meloxicam (Mobic) 15 mg tablet, TAKE 1 TABLET BY MOUTH DAILY AS  NEEDED, Disp: 90  "tablet, Rfl: 3    multivitamin with minerals iron-free (Adults 50 Plus), Take 1 tablet by mouth once daily., Disp: , Rfl:     Ozempic 1 mg/dose (4 mg/3 mL) pen injector, INJECT SUBCUTANEOUSLY 1 MG EVERY WEEK, Disp: 9 mL, Rfl: 3    pen needle, diabetic (BD Ultra-Fine Stephany Pen Needle) 32 gauge x 5/32\" needle, For use with insulin once daily., Disp: 100 each, Rfl: 3    risankizumab-rzaa (Skyrizi) 150 mg/mL pen injector pen, Inject 1 mL (150 mg) under the skin see administration instructions. Inject 1ml (150 mg) under the skin every 12 weeks., Disp: 1 mL, Rfl: 1    saccharomyces boulardii (Florastor) 250 mg capsule, Take 1 capsule (250 mg) by mouth 2 times a day., Disp: , Rfl:     triamcinolone (Kenalog) 0.1 % ointment, Thin coat to affected areas as needed., Disp: 80 g, Rfl: 3    triamterene-hydrochlorothiazid (Dyazide) 37.5-25 mg capsule, TAKE 1 CAPSULE BY MOUTH DAILY, Disp: 90 capsule, Rfl: 3    venlafaxine XR (Effexor-XR) 150 mg 24 hr capsule, TAKE 1 CAPSULE BY MOUTH DAILY  WITH FOOD, Disp: 90 capsule, Rfl: 3     Objective   Well appearing patient in no apparent distress; mood and affect are within normal limits.    A focused skin examination was performed. All findings within normal limits unless otherwise noted below.    Assessment/Plan   1. Psoriasis  Well-demarcated erythematous papules and plaques with overlying silvery scale in postauricular scalp and sulcus. Genitalia involvement. Recently restarted Skyrizi.       - Psoriasis is a common, noncontagious condition that can present in a variety of ways in the skin. The subtypes of this condition include plaque, inverse (or skin fold), guttate, erythrodermic, and pustular psoriasis. Plaque psoriasis, which represents approximately 85% of psoriasis cases, is a lifelong skin condition that affects about 2%-3% of the population worldwide. Plaque psoriasis skin lesions are typically red and raised with overlying scale. There may be papules (small, raised bumps) or " "plaques (larger, raised skin lesions that are bigger than a thumbnail), or both. People with plaque psoriasis typically have thickened, white scaly patches on their skin.  - While the exact cause of psoriasis is unknown, this condition is the result of an overactive immune system that attacks the skin and other organs of the body. Psoriasis is very common in some families, suggesting a likely genetic component contributing to this disease, but it can also occur in individuals with no family history of psoriasis. Psoriasis can be triggered by certain environmental causes, such as emotional stress, pregnancy, injury to the skin, bacterial skin infections such as a streptococcal infection (\"strep\"), smoking or alcohol consumption, and ingesting certain medications.  - Because plaque psoriasis is a lifelong, chronic condition that currently has no cure, the goal of treatment is to decrease the number of skin lesions and reduce symptoms such as itching and pain. Most beneficial treatments for plaque psoriasis work in part due to their ability to alleviate the body's abnormal immune attack of the skin and help prevent the excessive buildup of skin cells or flakes.  - Bathe daily to help soften scales and moisten the skin. Avoid harsh soaps and scrubbing the skin as these may worsen psoriasis. Moisturizing soaps and soap substitutes, such as unscented Dove Sensitive Skin Beauty Bar, Vanicream Cleansing Bar, and CeraVe Psoriasis Cleanser, are milder products for the skin.  - The application of moisturizers after water exposure or bathing may be helpful. Heavier oil-based moisturizers help to retain water in the skin better than water-based moisturizers. Thicker moisturizers such as petroleum jelly (Vaseline), Aquaphor Healing Ointment, - Eucerin Original Healing Cream, Vanicream, Aveeno Moisturizing Cream, CeraVe Healing Ointment, or CeraVe Moisturizing Cream can be applied to damp skin daily after bathing. Use cream and " ointments rather than lotions because lotions can dry out the skin.  - Apply over-the-counter hydrocortisone cream or ointment (0.5% or 1%) twice daily for 2-3 weeks at a time to help reduce itch and irritation. Stronger topical steroids are typically required for thicker psoriasis plaques. Long-term use of topical steroids should include periodic times of no treatment each month to avoid thinning of the skin.  - Use of products with salicylic acid (shampoos, cleansers, and ointments), such as Neutrogena T/Sal, can help soften and remove thick psoriasis scale in the scalp.  - Small doses of natural sunlight may be helpful, such as 10-15 minutes approximately 2 or 3 times per week. Avoid too much sun; however, and protect your healthy skin from excessive sun exposure to help prevent premature aging of the skin and skin cancers.  - Follow a healthy diet to maintain an ideal weight. (Being overweight may make plaque psoriasis worse.)  Patient Support Resources  - The National Psoriasis Foundation (https://www.psoriasis.org/) is a useful resource for patients and health professionals that has additional information regarding psoriasis and the various available treatments. The National Psoriasis Foundation website includes access to psoriasis-related articles, psoriasis research, a directory of health care professionals with an expertise in psoriasis, and opportunities for patients to volunteer or get involved in upcoming events.  Plan  - Slight improvement overall, stable on scalp and ears   - Start triamcinolone ointment, use as directed.   - Vtama samples given, will start once excoriated skin heals.   - If no improvement, will switch to Taltz (information given).   - Discussed risks, benefits, and side effects of medications.     Related Medications  hydrocortisone 2.5 % cream  Apply to the affected areas twice a day for up to two weeks.    risankizumab-rzaa (Skyrizi) 150 mg/mL pen injector pen  Inject 1 mL (150 mg)  under the skin see administration instructions. Inject 1ml (150 mg) under the skin every 12 weeks.    triamcinolone (Kenalog) 0.1 % ointment  Thin coat to affected areas as needed.      Follow up in 2 months for psoriasis. Please call me if there are any changes or development of concerning symptoms (lesion/skin condition is changing, bleeding, enlarging, or worsening).

## 2025-04-13 DIAGNOSIS — E78.2 MIXED HYPERLIPIDEMIA: ICD-10-CM

## 2025-04-13 DIAGNOSIS — M15.0 PRIMARY OSTEOARTHRITIS INVOLVING MULTIPLE JOINTS: ICD-10-CM

## 2025-04-13 DIAGNOSIS — F32.5 DEPRESSION, MAJOR, IN REMISSION (CMS-HCC): ICD-10-CM

## 2025-04-14 RX ORDER — LOVASTATIN 10 MG/1
10 TABLET ORAL NIGHTLY
Qty: 90 TABLET | Refills: 3 | Status: SHIPPED | OUTPATIENT
Start: 2025-04-14

## 2025-04-14 RX ORDER — VENLAFAXINE HYDROCHLORIDE 150 MG/1
CAPSULE, EXTENDED RELEASE ORAL
Qty: 90 CAPSULE | Refills: 3 | Status: SHIPPED | OUTPATIENT
Start: 2025-04-14

## 2025-04-14 RX ORDER — MELOXICAM 15 MG/1
15 TABLET ORAL DAILY PRN
Qty: 90 TABLET | Refills: 3 | Status: SHIPPED | OUTPATIENT
Start: 2025-04-14

## 2025-05-25 DIAGNOSIS — I10 BENIGN ESSENTIAL HYPERTENSION: ICD-10-CM

## 2025-05-26 RX ORDER — TRIAMTERENE AND HYDROCHLOROTHIAZIDE 37.5; 25 MG/1; MG/1
1 CAPSULE ORAL DAILY
Qty: 90 CAPSULE | Refills: 1 | Status: SHIPPED | OUTPATIENT
Start: 2025-05-26

## 2025-06-03 ENCOUNTER — APPOINTMENT (OUTPATIENT)
Dept: PRIMARY CARE | Facility: CLINIC | Age: 70
End: 2025-06-03
Payer: COMMERCIAL

## 2025-06-05 ENCOUNTER — TELEPHONE (OUTPATIENT)
Dept: DERMATOLOGY | Facility: CLINIC | Age: 70
End: 2025-06-05
Payer: COMMERCIAL

## 2025-06-05 NOTE — TELEPHONE ENCOUNTER
Patient contacted office stating that she feels like the Skyrizi is not working. Patient cancelled 2 month follow up scheduled for 06/11/2025. Per CNP, patient needs to come in for an appointment to discuss alternative treatments. Message sent to scheduling.     Alexandra Krishnamurthy RN

## 2025-06-11 ENCOUNTER — APPOINTMENT (OUTPATIENT)
Dept: DERMATOLOGY | Facility: CLINIC | Age: 70
End: 2025-06-11
Payer: COMMERCIAL

## 2025-06-25 DIAGNOSIS — Z12.31 ENCOUNTER FOR SCREENING MAMMOGRAM FOR BREAST CANCER: ICD-10-CM

## 2025-06-27 DIAGNOSIS — L40.9 PSORIASIS: ICD-10-CM

## 2025-06-28 RX ORDER — RISANKIZUMAB-RZAA 150 MG/ML
INJECTION SUBCUTANEOUS
Qty: 1 ML | Refills: 1 | OUTPATIENT
Start: 2025-06-28

## 2025-07-07 ENCOUNTER — APPOINTMENT (OUTPATIENT)
Dept: RADIOLOGY | Facility: CLINIC | Age: 70
End: 2025-07-07
Payer: COMMERCIAL

## 2025-07-08 ENCOUNTER — APPOINTMENT (OUTPATIENT)
Dept: PRIMARY CARE | Facility: CLINIC | Age: 70
End: 2025-07-08
Payer: COMMERCIAL

## 2025-07-23 ENCOUNTER — APPOINTMENT (OUTPATIENT)
Dept: DERMATOLOGY | Facility: CLINIC | Age: 70
End: 2025-07-23
Payer: COMMERCIAL

## 2025-07-23 DIAGNOSIS — L40.9 PSORIASIS: Primary | ICD-10-CM

## 2025-07-23 PROCEDURE — 99213 OFFICE O/P EST LOW 20 MIN: CPT | Performed by: NURSE PRACTITIONER

## 2025-07-23 PROCEDURE — 4010F ACE/ARB THERAPY RXD/TAKEN: CPT | Performed by: NURSE PRACTITIONER

## 2025-07-23 PROCEDURE — 1159F MED LIST DOCD IN RCRD: CPT | Performed by: NURSE PRACTITIONER

## 2025-07-23 NOTE — Clinical Note
"- Psoriasis is a common, noncontagious condition that can present in a variety of ways in the skin. The subtypes of this condition include plaque, inverse (or skin fold), guttate, erythrodermic, and pustular psoriasis. Plaque psoriasis, which represents approximately 85% of psoriasis cases, is a lifelong skin condition that affects about 2%-3% of the population worldwide. Plaque psoriasis skin lesions are typically red and raised with overlying scale. There may be papules (small, raised bumps) or plaques (larger, raised skin lesions that are bigger than a thumbnail), or both. People with plaque psoriasis typically have thickened, white scaly patches on their skin.  - While the exact cause of psoriasis is unknown, this condition is the result of an overactive immune system that attacks the skin and other organs of the body. Psoriasis is very common in some families, suggesting a likely genetic component contributing to this disease, but it can also occur in individuals with no family history of psoriasis. Psoriasis can be triggered by certain environmental causes, such as emotional stress, pregnancy, injury to the skin, bacterial skin infections such as a streptococcal infection (\"strep\"), smoking or alcohol consumption, and ingesting certain medications.  - Because plaque psoriasis is a lifelong, chronic condition that currently has no cure, the goal of treatment is to decrease the number of skin lesions and reduce symptoms such as itching and pain. Most beneficial treatments for plaque psoriasis work in part due to their ability to alleviate the body's abnormal immune attack of the skin and help prevent the excessive buildup of skin cells or flakes.  - Bathe daily to help soften scales and moisten the skin. Avoid harsh soaps and scrubbing the skin as these may worsen psoriasis. Moisturizing soaps and soap substitutes, such as unscented Dove Sensitive Skin Beauty Bar, Vanicream Cleansing Bar, and CeraVe Psoriasis " Cleanser, are milder products for the skin.  - The application of moisturizers after water exposure or bathing may be helpful. Heavier oil-based moisturizers help to retain water in the skin better than water-based moisturizers. Thicker moisturizers such as petroleum jelly (Vaseline), Aquaphor Healing Ointment, - Eucerin Original Healing Cream, Vanicream, Aveeno Moisturizing Cream, CeraVe Healing Ointment, or CeraVe Moisturizing Cream can be applied to damp skin daily after bathing. Use cream and ointments rather than lotions because lotions can dry out the skin.  - Apply over-the-counter hydrocortisone cream or ointment (0.5% or 1%) twice daily for 2-3 weeks at a time to help reduce itch and irritation. Stronger topical steroids are typically required for thicker psoriasis plaques. Long-term use of topical steroids should include periodic times of no treatment each month to avoid thinning of the skin.  - Use of products with salicylic acid (shampoos, cleansers, and ointments), such as Neutrogena T/Sal, can help soften and remove thick psoriasis scale in the scalp.  - Small doses of natural sunlight may be helpful, such as 10-15 minutes approximately 2 or 3 times per week. Avoid too much sun; however, and protect your healthy skin from excessive sun exposure to help prevent premature aging of the skin and skin cancers.  - Follow a healthy diet to maintain an ideal weight. (Being overweight may make plaque psoriasis worse.)  Patient Support Resources  - The National Psoriasis Foundation (https://www.psoriasis.org/) is a useful resource for patients and health professionals that has additional information regarding psoriasis and the various available treatments. The National Psoriasis Foundation website includes access to psoriasis-related articles, psoriasis research, a directory of health care professionals with an expertise in psoriasis, and opportunities for patients to volunteer or get involved in upcoming  events.  Plan  - Slight improvement overall, stable on scalp and ears   - Start triamcinolone ointment, use as directed.   - Vtama samples given, will start once excoriated skin heals.   - If no improvement, will switch to Taltz (information given).   - Discussed risks, benefits, and side effects of medications.

## 2025-07-23 NOTE — PROGRESS NOTES
Subjective     Liliana Bhardwaj is a 70 y.o. female who presents for the following: Psoriasis (Patient feels like the Skyrizi is not working and would like to discuss alternative treatments. Patient states her psoriasis has not worsened ).          Review of Systems:  No other skin or systemic complaints other than what is documented elsewhere in the note.    The following portions of the chart were reviewed this encounter and updated as appropriate:         Skin Cancer History  Biopsy Log Book  No skin cancers from Specimen Tracking.    Additional History      Specialty Problems          Dermatology Problems    Psoriasis    - managed by Dr. Mayne  - on Jhonabhay as of Jan 2025          Past Medical History:  Liliana Bhardwaj  has a past medical history of Acquired hemophilia (Multi), COVID-19 (06/07/2021), Left humeral fracture (06/01/2020), Old myocardial infarction, and Sarcoidosis of lung (Multi).    Past Surgical History:  Liliana Bhardwaj  has a past surgical history that includes Coronary artery bypass graft (06/01/2020); Carpal tunnel release (06/01/2020); Cholecystectomy (06/01/2020); Cataract extraction (06/01/2020); Lung biopsy (06/01/2020); Ankle fracture surgery (06/01/2020); Colonoscopy (09/25/2020); Lumbar discectomy; Spinal fusion; and Total shoulder arthroplasty (Left, 07/08/2024).    Family History:  Patient family history includes Alcohol abuse in her brother and father; Brain cancer in her father; Cancer in her brother; Coronary artery disease in her brother, father, and mother; Depression in her mother; Diabetes type II in her mother and paternal grandfather; Heart disease in her father and mother; Hyperlipidemia in her father; Hypertension in her mother; Miscarriages / Stillbirths in her mother.    Social History:  Liliana Bhardwaj  reports that she quit smoking about 19 years ago. Her smoking use included cigarettes. She has a 85 pack-year smoking history. She has never used smokeless tobacco. She  reports that she does not currently use alcohol. She reports that she does not currently use drugs.    Allergies:  Azathioprine, Ciprofloxacin, Montelukast, Nitrofurantoin, Nitrofurantoin macrocrystal, Penicillins, Sulfa (sulfonamide antibiotics), and Zafirlukast    Current Medications / CAM's:  Current Medications[1]     Objective   Well appearing patient in no apparent distress; mood and affect are within normal limits.    A focused skin examination was performed. All findings within normal limits unless otherwise noted below.    Assessment/Plan   Skin Exam  1. PSORIASIS  Generalized  Well-demarcated erythematous papules and plaques with overlying silvery scale in postauricular scalp and sulcus. Genitalia involvement. Recently restarted Skyrizi (March 2025)    - Psoriasis is a common, noncontagious condition that can present in a variety of ways in the skin. The subtypes of this condition include plaque, inverse (or skin fold), guttate, erythrodermic, and pustular psoriasis. Plaque psoriasis, which represents approximately 85% of psoriasis cases, is a lifelong skin condition that affects about 2%-3% of the population worldwide. Plaque psoriasis skin lesions are typically red and raised with overlying scale. There may be papules (small, raised bumps) or plaques (larger, raised skin lesions that are bigger than a thumbnail), or both. People with plaque psoriasis typically have thickened, white scaly patches on their skin.  - While the exact cause of psoriasis is unknown, this condition is the result of an overactive immune system that attacks the skin and other organs of the body. Psoriasis is very common in some families, suggesting a likely genetic component contributing to this disease, but it can also occur in individuals with no family history of psoriasis. Psoriasis can be triggered by certain environmental causes, such as emotional stress, pregnancy, injury to the skin, bacterial skin infections such as a  "streptococcal infection (\"strep\"), smoking or alcohol consumption, and ingesting certain medications.  - Because plaque psoriasis is a lifelong, chronic condition that currently has no cure, the goal of treatment is to decrease the number of skin lesions and reduce symptoms such as itching and pain. Most beneficial treatments for plaque psoriasis work in part due to their ability to alleviate the body's abnormal immune attack of the skin and help prevent the excessive buildup of skin cells or flakes.  - Bathe daily to help soften scales and moisten the skin. Avoid harsh soaps and scrubbing the skin as these may worsen psoriasis. Moisturizing soaps and soap substitutes, such as unscented Dove Sensitive Skin Beauty Bar, Vanicream Cleansing Bar, and CeraVe Psoriasis Cleanser, are milder products for the skin.  - The application of moisturizers after water exposure or bathing may be helpful. Heavier oil-based moisturizers help to retain water in the skin better than water-based moisturizers. Thicker moisturizers such as petroleum jelly (Vaseline), Aquaphor Healing Ointment, - Eucerin Original Healing Cream, Vanicream, Aveeno Moisturizing Cream, CeraVe Healing Ointment, or CeraVe Moisturizing Cream can be applied to damp skin daily after bathing. Use cream and ointments rather than lotions because lotions can dry out the skin.  - Apply over-the-counter hydrocortisone cream or ointment (0.5% or 1%) twice daily for 2-3 weeks at a time to help reduce itch and irritation. Stronger topical steroids are typically required for thicker psoriasis plaques. Long-term use of topical steroids should include periodic times of no treatment each month to avoid thinning of the skin.  - Use of products with salicylic acid (shampoos, cleansers, and ointments), such as Neutrogena T/Sal, can help soften and remove thick psoriasis scale in the scalp.  - Small doses of natural sunlight may be helpful, such as 10-15 minutes approximately 2 or 3 " times per week. Avoid too much sun; however, and protect your healthy skin from excessive sun exposure to help prevent premature aging of the skin and skin cancers.  - Follow a healthy diet to maintain an ideal weight. (Being overweight may make plaque psoriasis worse.)  Patient Support Resources  - The National Psoriasis Foundation (https://www.psoriasis.org/) is a useful resource for patients and health professionals that has additional information regarding psoriasis and the various available treatments. The National Psoriasis Foundation website includes access to psoriasis-related articles, psoriasis research, a directory of health care professionals with an expertise in psoriasis, and opportunities for patients to volunteer or get involved in upcoming events.  Plan  - Slight improvement overall, stable on scalp and ears   - Start triamcinolone ointment, use as directed.   - Vtama samples given, will start once excoriated skin heals.   - If no improvement, will switch to Taltz (information given).   - Discussed risks, benefits, and side effects of medications.   Existing Treatments  - hydrocortisone 2.5 % cream - Apply to the affected areas twice a day for up to two weeks.  - risankizumab-rzaa (Skyrizi) 150 mg/mL pen injector pen - Inject 1 mL (150 mg) under the skin see administration instructions. Inject 1ml (150 mg) under the skin every 12 weeks.  - triamcinolone (Kenalog) 0.1 % ointment - Thin coat to affected areas as needed.    Follow up in 4-6 months. Please call me if there are any changes or development of concerning symptoms (lesion/skin condition is changing, bleeding, enlarging, or worsening).         [1]   Current Outpatient Medications:     aspirin 81 mg EC tablet, Take 1 tablet (81 mg) by mouth once daily., Disp: , Rfl:     atenolol (Tenormin) 25 mg tablet, TAKE 1 TABLET BY MOUTH DAILY AS  DIRECTED, Disp: 90 tablet, Rfl: 3    calcium carbonate 600 mg calcium (1,500 mg) tablet, Take 2 tablets  "(3,000 mg) by mouth once daily., Disp: , Rfl:     cholecalciferol (Vitamin D-3) 50 MCG (2000 UT) tablet, Take 1 tablet (2,000 Units) by mouth 2 times a day., Disp: , Rfl:     cholestyramine (Questran) 4 gram packet, MIX THE CONTENTS OF 1 PACKET BY  MOUTH WITH 2.6 OUNCE OF  NONCARBONATED CARLYN AND SWALLOW  ONCE DAILY, Disp: 120 each, Rfl: 3    clobetasol (Temovate) 0.05 % gel, Apply topically 2 times a day., Disp: , Rfl:     dapagliflozin propanediol (Farxiga) 10 mg, Take 1 tablet (10 mg) by mouth once daily in the morning. Take before meals., Disp: 90 tablet, Rfl: 3    doxepin 3 mg tablet, Take 1 tablet (3 mg) by mouth as needed at bedtime (sleep)., Disp: , Rfl:     hydrocortisone 2.5 % cream, Apply to the affected areas twice a day for up to two weeks., Disp: 60 g, Rfl: 2    Lactobacillus acidophilus (PROBIOTIC ORAL), Take by mouth. Use as directed, Disp: , Rfl:     Lantus Solostar U-100 Insulin 100 unit/mL (3 mL) pen, INJECT 28 UNITS SUBCUTANEOUSLY  ONCE DAILY AT BEDTIME, Disp: 30 mL, Rfl: 3    lidocaine (Lidoderm) 5 % patch, Place 1 patch on the skin once daily., Disp: , Rfl:     lisinopril 10 mg tablet, Take 1 tablet (10 mg) by mouth once daily., Disp: 90 tablet, Rfl: 3    lovastatin (Mevacor) 10 mg tablet, TAKE 1 TABLET BY MOUTH EVERY  EVENING, Disp: 90 tablet, Rfl: 3    meloxicam (Mobic) 15 mg tablet, TAKE 1 TABLET BY MOUTH DAILY AS  NEEDED, Disp: 90 tablet, Rfl: 3    multivitamin with minerals iron-free (Adults 50 Plus), Take 1 tablet by mouth once daily., Disp: , Rfl:     Ozempic 1 mg/dose (4 mg/3 mL) pen injector, INJECT SUBCUTANEOUSLY 1 MG EVERY WEEK, Disp: 9 mL, Rfl: 3    pen needle, diabetic (BD Ultra-Fine Stephany Pen Needle) 32 gauge x 5/32\" needle, For use with insulin once daily., Disp: 100 each, Rfl: 3    risankizumab-rzaa (Skyrizi) 150 mg/mL pen injector pen, Inject 1 mL (150 mg) under the skin see administration instructions. Inject 1ml (150 mg) under the skin every 12 weeks., Disp: 1 mL, Rfl: 1    " saccharomyces boulardii (Florastor) 250 mg capsule, Take 1 capsule (250 mg) by mouth 2 times a day., Disp: , Rfl:     triamcinolone (Kenalog) 0.1 % ointment, Thin coat to affected areas as needed., Disp: 80 g, Rfl: 3    triamterene-hydrochlorothiazid (Dyazide) 37.5-25 mg capsule, TAKE 1 CAPSULE BY MOUTH DAILY, Disp: 90 capsule, Rfl: 1    venlafaxine XR (Effexor-XR) 150 mg 24 hr capsule, TAKE 1 CAPSULE BY MOUTH DAILY  WITH FOOD, Disp: 90 capsule, Rfl: 3

## 2025-07-23 NOTE — Clinical Note
Well-demarcated erythematous papules and plaques with overlying silvery scale in postauricular scalp and sulcus. Genitalia involvement. Recently restarted Skyrizi (March 2025)

## 2025-08-03 DIAGNOSIS — E11.29 TYPE 2 DIABETES MELLITUS WITH OTHER DIABETIC KIDNEY COMPLICATION: ICD-10-CM

## 2025-08-04 ENCOUNTER — TELEPHONE (OUTPATIENT)
Dept: DERMATOLOGY | Facility: CLINIC | Age: 70
End: 2025-08-04
Payer: COMMERCIAL

## 2025-08-04 RX ORDER — INSULIN GLARGINE 100 [IU]/ML
INJECTION, SOLUTION SUBCUTANEOUS
Qty: 30 ML | Refills: 3 | Status: SHIPPED | OUTPATIENT
Start: 2025-08-04

## 2025-08-04 NOTE — TELEPHONE ENCOUNTER
Returned patient voicemail and left message letting patient know that [facility]zel goes to [facility] and she can reach out to them to schedule delivery. Advised patient to contact office if she needs refills and/or if the Skyrizi is not working in order to schedule an appointment with Dayanna for further evaluation. Call back number left.    Alexandra Krishnamurthy RN

## 2025-08-06 DIAGNOSIS — L40.9 PSORIASIS: ICD-10-CM

## 2025-08-06 RX ORDER — RISANKIZUMAB-RZAA 150 MG/ML
INJECTION SUBCUTANEOUS
Qty: 1 ML | Refills: 1 | Status: SHIPPED | OUTPATIENT
Start: 2025-08-06